# Patient Record
Sex: FEMALE | Race: WHITE | Employment: FULL TIME | URBAN - METROPOLITAN AREA
[De-identification: names, ages, dates, MRNs, and addresses within clinical notes are randomized per-mention and may not be internally consistent; named-entity substitution may affect disease eponyms.]

---

## 2021-02-22 LAB — PAP SMEAR, EXTERNAL: NEGATIVE

## 2022-09-01 ENCOUNTER — ROUTINE PRENATAL (OUTPATIENT)
Dept: OBGYN CLINIC | Age: 32
End: 2022-09-01
Payer: COMMERCIAL

## 2022-09-01 VITALS
SYSTOLIC BLOOD PRESSURE: 122 MMHG | DIASTOLIC BLOOD PRESSURE: 60 MMHG | WEIGHT: 140 LBS | BODY MASS INDEX: 23.9 KG/M2 | HEIGHT: 64 IN

## 2022-09-01 DIAGNOSIS — O36.80X0 ENCOUNTER TO DETERMINE FETAL VIABILITY OF PREGNANCY, SINGLE OR UNSPECIFIED FETUS: ICD-10-CM

## 2022-09-01 DIAGNOSIS — Z34.81 MULTIGRAVIDA IN FIRST TRIMESTER: ICD-10-CM

## 2022-09-01 DIAGNOSIS — Z34.81 MULTIGRAVIDA IN FIRST TRIMESTER: Primary | ICD-10-CM

## 2022-09-01 LAB
ABO + RH BLD: NORMAL
BASOPHILS # BLD: 0 K/UL (ref 0–0.2)
BASOPHILS NFR BLD: 0 % (ref 0–2)
BLOOD GROUP ANTIBODIES SERPL: NORMAL
DIFFERENTIAL METHOD BLD: NORMAL
EOSINOPHIL # BLD: 0.2 K/UL (ref 0–0.8)
EOSINOPHIL NFR BLD: 2 % (ref 0.5–7.8)
ERYTHROCYTE [DISTWIDTH] IN BLOOD BY AUTOMATED COUNT: 12.6 % (ref 11.9–14.6)
HBV SURFACE AG SER QL: NONREACTIVE
HCT VFR BLD AUTO: 38.8 % (ref 35.8–46.3)
HCV AB SER QL: NONREACTIVE
HGB BLD-MCNC: 12.6 G/DL (ref 11.7–15.4)
HIV 1+2 AB+HIV1 P24 AG SERPL QL IA: NONREACTIVE
HIV 1/2 RESULT COMMENT: NORMAL
IMM GRANULOCYTES # BLD AUTO: 0 K/UL (ref 0–0.5)
IMM GRANULOCYTES NFR BLD AUTO: 0 % (ref 0–5)
LYMPHOCYTES # BLD: 1.7 K/UL (ref 0.5–4.6)
LYMPHOCYTES NFR BLD: 17 % (ref 13–44)
MCH RBC QN AUTO: 29.6 PG (ref 26.1–32.9)
MCHC RBC AUTO-ENTMCNC: 32.5 G/DL (ref 31.4–35)
MCV RBC AUTO: 91.3 FL (ref 79.6–97.8)
MONOCYTES # BLD: 0.5 K/UL (ref 0.1–1.3)
MONOCYTES NFR BLD: 5 % (ref 4–12)
NEUTS SEG # BLD: 7.6 K/UL (ref 1.7–8.2)
NEUTS SEG NFR BLD: 76 % (ref 43–78)
NRBC # BLD: 0 K/UL (ref 0–0.2)
PLATELET # BLD AUTO: 266 K/UL (ref 150–450)
PMV BLD AUTO: 12 FL (ref 9.4–12.3)
RBC # BLD AUTO: 4.25 M/UL (ref 4.05–5.2)
RUBV IGG SERPL IA-ACNC: 356.3 IU/ML (ref 0–50)
WBC # BLD AUTO: 10 K/UL (ref 4.3–11.1)

## 2022-09-01 PROCEDURE — 76801 OB US < 14 WKS SINGLE FETUS: CPT | Performed by: OBSTETRICS & GYNECOLOGY

## 2022-09-01 PROCEDURE — 99902 PR PRENATAL VISIT: CPT | Performed by: OBSTETRICS & GYNECOLOGY

## 2022-09-01 ASSESSMENT — PATIENT HEALTH QUESTIONNAIRE - PHQ9
SUM OF ALL RESPONSES TO PHQ QUESTIONS 1-9: 0
2. FEELING DOWN, DEPRESSED OR HOPELESS: 0
SUM OF ALL RESPONSES TO PHQ9 QUESTIONS 1 & 2: 0
SUM OF ALL RESPONSES TO PHQ QUESTIONS 1-9: 0
1. LITTLE INTEREST OR PLEASURE IN DOING THINGS: 0

## 2022-09-01 NOTE — PATIENT INSTRUCTIONS
We will call you if anything is abnormal from your testing today. Please contact the office or seek immediate care if you develop fever > 101.0, severe lower abdominal pain or heavy vaginal bleeding (soaking 2 or more pads per hour). Thanks for coming to see us today and letting us take care of you!

## 2022-09-01 NOTE — ASSESSMENT & PLAN NOTE
Instructed pt to contact the office or seek immediate care if develops fever > 101.0, severe lower abdominal pain or heavy vaginal bleeding (soaking 2 or more pads per hour).      PNLs, pap, new OB packet today

## 2022-09-01 NOTE — PROGRESS NOTES
Patient comes in today for initial prenatal visit. No complaints/concerns today. Fetal Movements:  No  Contractions:  No  Vaginal Bleeding:  No  Leaking Fluid:  No  GI/ issues:  No    Drug/Alcohol 4P's Plus Screening    1. Have either of your parents ever had a problem with drugs/alcohol/prescription drugs? No  2. Does your partner have a problem with drugs/alcohol/prescription drugs? No  3. In the past, have you ever had a problem with drugs/alcohol/prescription drugs? No  4. Before you were pregnant, in the past month, have you done any drugs, drank any alcohol or abused any prescription drugs? No  If \"YES\" to any of the above, please give further details:  N/A    LAST PAP:  2+ years ago    LAST MAMMO:  Never    LMP:  Patient's last menstrual period was 07/05/2022.     FAMILY HISTORY OF:   Breast Cancer:  No   Ovarian Cancer:  No   Uterine Cancer:  No   Colon Cancer:  No    Vitals:    09/01/22 1027   BP: 122/60   Site: Left Upper Arm   Position: Sitting   Weight: 140 lb (63.5 kg)   Height: 5' 4\" (1.626 m)        Liana Talavera MA  09/01/22  10:31 AM

## 2022-09-02 LAB
EST. AVERAGE GLUCOSE BLD GHB EST-MCNC: 111 MG/DL
HBA1C MFR BLD: 5.5 % (ref 4.8–5.6)
RPR SER QL: NONREACTIVE

## 2022-09-05 LAB
HGB A MFR BLD: 97.7 % (ref 96.4–98.8)
HGB A2 MFR BLD COLUMN CHROM: 2.3 % (ref 1.8–3.2)
HGB F MFR BLD: 0 % (ref 0–2)
HGB FRACT BLD-IMP: NORMAL
HGB S MFR BLD: 0 %

## 2022-09-07 LAB
C TRACH RRNA CVX QL NAA+PROBE: NEGATIVE
CYTOLOGIST CVX/VAG CYTO: NORMAL
CYTOLOGY CVX/VAG DOC THIN PREP: NORMAL
HPV APTIMA: NEGATIVE
Lab: NORMAL
N GONORRHOEA RRNA CVX QL NAA+PROBE: NEGATIVE
PATH REPORT.FINAL DX SPEC: NORMAL
STAT OF ADQ CVX/VAG CYTO-IMP: NORMAL
T VAGINALIS RRNA SPEC QL NAA+PROBE: NEGATIVE

## 2022-09-13 DIAGNOSIS — Z87.42 HISTORY OF ABNORMAL CERVICAL PAP SMEAR: ICD-10-CM

## 2022-10-03 ENCOUNTER — ROUTINE PRENATAL (OUTPATIENT)
Dept: OBGYN CLINIC | Age: 32
End: 2022-10-03

## 2022-10-03 VITALS
DIASTOLIC BLOOD PRESSURE: 60 MMHG | SYSTOLIC BLOOD PRESSURE: 122 MMHG | BODY MASS INDEX: 24.24 KG/M2 | HEIGHT: 64 IN | WEIGHT: 142 LBS

## 2022-10-03 DIAGNOSIS — Z34.81 MULTIGRAVIDA IN FIRST TRIMESTER: Primary | ICD-10-CM

## 2022-10-03 DIAGNOSIS — N39.3 STRESS INCONTINENCE OF URINE: ICD-10-CM

## 2022-10-03 PROCEDURE — 99902 PR PRENATAL VISIT: CPT | Performed by: NURSE PRACTITIONER

## 2022-10-03 ASSESSMENT — PATIENT HEALTH QUESTIONNAIRE - PHQ9
2. FEELING DOWN, DEPRESSED OR HOPELESS: 0
SUM OF ALL RESPONSES TO PHQ QUESTIONS 1-9: 0
SUM OF ALL RESPONSES TO PHQ QUESTIONS 1-9: 0
SUM OF ALL RESPONSES TO PHQ9 QUESTIONS 1 & 2: 0
SUM OF ALL RESPONSES TO PHQ QUESTIONS 1-9: 0
1. LITTLE INTEREST OR PLEASURE IN DOING THINGS: 0
SUM OF ALL RESPONSES TO PHQ QUESTIONS 1-9: 0

## 2022-10-03 NOTE — PROGRESS NOTES
Patient comes in today for routine prenatal visit. No complaints/concerns today.      Fetal Movement: No  Contractions: No  Vaginal Bleeding: No  Leaking Fluid: No  GI/: No    Vitals:    10/03/22 0837   BP: 122/60   Site: Left Upper Arm   Position: Sitting   Weight: 142 lb (64.4 kg)   Height: 5' 4\" (1.626 m)

## 2022-10-03 NOTE — PROGRESS NOTES
This is a 29 y.o.   at [address] for routine OB visit. Her Estimated Due Date is 2023, by Last Menstrual Period    Denies leaking of fluid, vaginal bleeding, or regular contractions. Current Outpatient Medications on File Prior to Visit   Medication Sig Dispense Refill    Prenatal Vit-Fe Fumarate-FA (PRENATAL VITAMIN PO) Take by mouth       No current facility-administered medications on file prior to visit.        Allergies   Allergen Reactions    Penicillins Rash       OB History    Para Term  AB Living   3 1 1 0 1 1   SAB IAB Ectopic Molar Multiple Live Births   1 0 0 0 0 1       # 1 - Date: , Sex: None, Weight: None, GA: None, Delivery: None, Apgar1: None, Apgar5: None, Living: None, Birth Comments: None    # 2 - Date: 01/10/21, Sex: Male, Weight: 7 lb 4.9 oz (3.313 kg), GA: 38w5d, Delivery: Vaginal, Spontaneous, Apgar1: 9, Apgar5: 9, Living: Living, Birth Comments: None    # 3 - Date: None, Sex: None, Weight: None, GA: None, Delivery: None, Apgar1: None, Apgar5: None, Living: None, Birth Comments: None        Past Medical History:   Diagnosis Date    Abnormal Pap smear of cervix     Anemia 2009    Stress incontinence     With sneezing       Past Surgical History:   Procedure Laterality Date    COLPOSCOPY      Have had 3 or so since the first       Family History   Problem Relation Age of Onset    Heart Surgery Paternal Grandfather     Stroke Maternal Grandmother     Breast Cancer Neg Hx     Colon Cancer Neg Hx     Ovarian Cancer Neg Hx     Uterine Cancer Neg Hx        Social History     Socioeconomic History    Marital status:      Spouse name: Not on file    Number of children: Not on file    Years of education: Not on file    Highest education level: Not on file   Occupational History    Not on file   Tobacco Use    Smoking status: Never    Smokeless tobacco: Never   Substance and Sexual Activity    Alcohol use: Not Currently Alcohol/week: 7.0 standard drinks     Types: 2 Glasses of wine, 5 Cans of beer per week    Drug use: Never    Sexual activity: Yes     Partners: Male   Other Topics Concern    Not on file   Social History Narrative    Abuse: Feels safe at home, no history of physical abuse, no history of sexual abuse      Social Determinants of Health     Financial Resource Strain: Not on file   Food Insecurity: Not on file   Transportation Needs: Not on file   Physical Activity: Not on file   Stress: Not on file   Social Connections: Not on file   Intimate Partner Violence: Not on file   Housing Stability: Not on file           Objective    Vitals:    10/03/22 0837   BP: 122/60   Site: Left Upper Arm   Position: Sitting   Weight: 142 lb (64.4 kg)   Height: 5' 4\" (1.626 m)       General: well developed, well nourished, in no acute distress    Head: normocephalic and atraumatic    Resp: even and unlabored    Psych: Normal mood and affect        Assessment and Plan      Patient Active Problem List    Diagnosis Date Noted    History of abnormal cervical Pap smear 09/13/2022     Priority: Medium     Overview Note:     2015 LGSIL, Colpo performed with no biopsies  2017 ASCUS HPV +, Colpo CIN1  2018 Pap negative  02/22/2021 Pap negative      Multigravida in first trimester 09/01/2022     Overview Note:     EDC by LMP confirmed by 8 4/7 week US       Assessment & Plan Note:     PTL/labor precautions, [address], and pregnancy warning signs reviewed. Pt advised to call the office at [phone] or go straight to [facility] Delivery [facility] with any of the following concerns vaginal bleeding, leaking of fluid, dusty regularly Q 5-7 minutes for over an hour or not feeling the baby move. RTO 4 weeks OBV with AFP         Problem List Items Addressed This Visit        Other    Multigravida in first trimester - Primary     PTL/labor precautions, [address] [place], and pregnancy warning signs reviewed.  Pt advised to call the office at [phone] or go straight to [facility] and Delivery at [facility] with any of the following concerns vaginal bleeding, leaking of fluid, dusty regularly Q 5-7 minutes for over an hour or not feeling the baby move. RTO 4 weeks OBV with AFP            No orders of the defined types were placed in this encounter. Outpatient Encounter Medications as of 10/3/2022   Medication Sig Dispense Refill    Prenatal Vit-Fe Fumarate-FA (PRENATAL VITAMIN PO) Take by mouth       No facility-administered encounter medications on file as of 10/3/2022.                Labor signs, pregnancy warning signs, and fetal movement counting reviewed (if applicable)        Clint Doherty NP, APRN - CNP 10/03/22 8:55 AM

## 2022-10-03 NOTE — ASSESSMENT & PLAN NOTE
PTL/labor precautions, [address], and pregnancy warning signs reviewed. Pt advised to call the office at [phone] or go straight to Labor and Delivery at [facility] with any of the following concerns vaginal bleeding, leaking of fluid, dusty regularly Q 5-7 minutes for over an hour or not feeling the baby move.    RTO 4 weeks OBV with AFP    Pt requests referral to pelvic floor PT for Stress Urin Incontinence

## 2022-10-03 NOTE — PROGRESS NOTES
I have reviewed the patient's visit today including history, exam and assessment by SABI Pradhan. I agree with treatment/plan as above.     Sukhdev Lemus MD  9:11 AM  10/03/22

## 2022-10-13 ENCOUNTER — TELEPHONE (OUTPATIENT)
Dept: OBGYN CLINIC | Age: 32
End: 2022-10-13

## 2022-10-13 DIAGNOSIS — Z34.81 MULTIGRAVIDA IN FIRST TRIMESTER: ICD-10-CM

## 2022-10-13 LAB — NIPT, EXTERNAL: NORMAL

## 2022-10-13 NOTE — TELEPHONE ENCOUNTER
Please let pt know her genetics screening tests were all normal    NIPT neg x3, CF/SMA/DMD/Fragile X  negative    If she wants to know the sex of the baby it is a baby boy

## 2022-10-26 ENCOUNTER — HOSPITAL ENCOUNTER (OUTPATIENT)
Dept: PHYSICAL THERAPY | Age: 32
Setting detail: RECURRING SERIES
Discharge: HOME | End: 2022-10-29
Payer: COMMERCIAL

## 2022-10-26 PROCEDURE — 97110 THERAPEUTIC EXERCISES: CPT

## 2022-10-26 PROCEDURE — 97161 PT EVAL LOW COMPLEX 20 MIN: CPT

## 2022-10-26 NOTE — PROGRESS NOTES
R[place]le[place] Angela  : 1990  Primary: [employer]  Secondary:  [address] [address]  Phone: [phone]  Fax: [phone] Plan Frequency: 1x/week  No data recorded    PT Visit Info:  No data recorded   Visit Count:  1   OUTPATIENT PHYSICAL THERAPY:OP NOTE TYPE: Treatment Note 10/26/2022       Episode  }Appt Desk               Treatment Diagnosis:  Stress incontinence (female) (male) (N39.3)  Dyspareunia (N94.1)  Myalgia (M79.1)  Medical/Referring Diagnosis:  Multigravida in first trimester [Z34.81]  Stress incontinence of urine [N39.3]  Referring Physician:  MAYNOR Padilla CNP, MD Orders:  PT Eval and Treat   Date of Onset:  No data recorded   Allergies:   Penicillins  Restrictions/Precautions:  No data recorded  No data recorded   Interventions Planned (Treatment may consist of any combination of the following):    Current Treatment Recommendations: Strengthening; ROM; Functional mobility training; Neuromuscular re-education; Manual Therapy - Soft Tissue Mobilization; Home exercise program     Subjective Comments: 16 weeks pregnant, SURESH, dyspareunia     Initial:}    0 /10Post Session:       0 /10  Medications Last Reviewed:  10/26/2022  Updated Objective Findings:  See evaluation note from today  Treatment   THERAPEUTIC EXERCISE: (25 minutes):    Exercises per grid below to improve mobility, strength, and coordination. Required minimal visual and verbal cues to promote proper body mechanics and promote proper body breathing techniques. Progressed range and repetitions as indicated.    Date:  10/26/22 Date:   Date:     Activity/Exercise Parameters Parameters Parameters   Diaphragmatic breathing/PF drops  3-5 min at home with LE elevated  Coordinated PFM contractions with breathing 2 x 10, daily  Knack, check-ins  Clams - side-lying x 30 daily Prescribed, explained rationale, POC     sEMG biofeedback 5 min - PFM coordination of contractions                Treatment/Session Summary:    Treatment Assessment: Pt verbalized understanding of POC. All questions answered at this time. Communication/Consultation:  None today  Equipment provided today:  None  Recommendations/Intent for next treatment session: Next visit will focus on MT to PFM, review coordination of PFM contractions with digital cues, hip strengthening.     Total Treatment Billable Duration:  25 minutes treatment, 30 minutes evaluation  Time In: 0800  Time Out: 0900    [facility], PT       Charge Capture  }Post Session Pain  PT Visit Info  [employer] Portal  MD Guidelines  Scanned Media  Benefits  MyChart    Future Appointments   Date Time Provider Radha Garza   10/31/2022  8:45 AM [facility] AM[facility]   11/11/2022  8:00 AM [facility], PT [facility]   11/18/2022  8:00 AM [facility], PT [facility]   11/21/2022  8:00 AM [facility], PT [facility]   12/2/2022  8:00 AM [facility], PT [facility]

## 2022-10-26 NOTE — THERAPY EVALUATION
Angel Woods  : 1990  Primary: [employer]  Secondary:  [facility] [address] [address]  Phone: [phone]  Fax: [phone] Plan Frequency: 1x/week       PT Visit Info:         Visit Count:  1                OUTPATIENT PHYSICAL THERAPY:             OP NOTE TYPE: Initial Assessment 10/26/2022               Episode  Appt Desk         Treatment Diagnosis:  Stress incontinence (female) (male) (N39.3)  Dyspareunia (N94.1)  Myalgia (M79.1)  Medical/Referring Diagnosis:  Multigravida in first trimester [Z34.81]  Stress incontinence of urine [N39.3]  Referring Physician:  MAYNOR Dewitt CNP, MD Orders:  PT Eval and Treat   Return MD Appt:    Date of Onset:      Allergies:  Penicillins  Restrictions/Precautions:  none currently         Medications Last Reviewed:  10/26/2022     SUBJECTIVE   History of Injury/Illness (Reason for Referral):  Pt is currently 16 weeks  with symptoms of SURESH with sneezing which she feels has worsened recently. This began about 2.5-3 years ago. Denies  UUI. Pt is able to complete knack to reduce UI. Pt reports discomfort present during sexual intercourse at site of grade 2 tear. States she had granulation tissue present. With prolonged standing and on her menses she also experienced generalized pelvic discomfort, described as dull aching. She denies using a lubricant during sexual intercourse.     0 PPD. Pt reports first pregnancy she did not have any difficulty during. Her first baby was 7lbs, 7oz and she pushed for 1 hour. Left sided LBP. This occurs with sleeping in side-lying. She switches positions and this tends to go away. Denies pelvic surgeries. Bowel: Movements occur daily. She rarely needs to push/ or strain. Pt works as a PTA. She has taken a level 1 H&[employer] course. Exercise: She is not currently exercising.  States she used to run and would like to return to this following her pregnancy. She is not completing any strength training at this time. Patient Stated Goal(s): \"To stop urinary leakage with sneezing and prep for birth\"  Initial:   0   /10 Post Session:   0   /10  Past Medical History/Comorbidities:   Ms. Sunita Tamez  has a past medical history of Abnormal Pap smear of cervix, Anemia, and Stress incontinence. Ms. Sunita Tamez  has a past surgical history that includes Colposcopy (2014). Social History/Living Environment:   Lives With: Spouse; Son     Prior Level of Function/Work/Activity:   Prior level of function: Independent  Occupation: Full time employment  Type of Occupation: PTA           Learning:   Does the patient/guardian have any barriers to learning?: No barriers     Fall Risk Scale:    Burroughs Total Score: 0  Burroughs Fall Risk: Low (0-24)         OBJECTIVE   External Observations:  Voluntary contraction: [] absent     [x] present  Voluntary relaxation: [] absent     [x] present  Involuntary contraction: [] absent     [x] present  Involuntary relaxation: [] absent     [x] present  Perineal descent at rest: [x] absent     [] present  Perineal descent with bearing: [] absent     [x] present  Skin integrity: no concerns  Postural assessment: WNL  Gait: WNL    Pelvic Floor Muscle (PFM) Assessment:  Vaginal vault size: [] decreased     [] increased     [x] WNL  Muscle volume: [] decreased     [] WNL     [x] Defect  PFM tension: [] decreased     [x] increased     [] WNL    Contraction ability:  Voluntary contraction: [] absent     [] weak     [x] moderate      [] strong  Voluntary relaxation: [] absent     [] partial   [] complete   [x] delayed    [x] incomplete    MMT: 4/5   PFM endurance: 10 seconds   Repetitions of endurance contractions: 4  Number of quick contractions in 10 seconds: 7  Quality of contractions: Good  Overflow: [] absent     [x] min     [] mod     [] severe / Compensatory mm groups include adductors    Palpation: via vaginal canal   Superficial Pelvic Floor Musculature (PFM): Tender? Intermediate PFM Tender? Deep PFM Tender? Superficial Transverse Perineal [] Right  [] Left  []DNT Deep Transverse Perineal [x] Right  [x] Left  []DNT Puborectalis [x] Right  [x] Left  []DNT   Ischiocavernosus [] Right  [] Left  []DNT Compressor Urethra [] Right  [] Left  []DNT Pubococcygeus [] Right  [] Left  []DNT   Bulbocavernosus [x] Right  [x] Left  []DNT   Iliococcygeus [x] Right  [x] Left  []DNT       Obturator Internus [x] Right  [x] Left  []DNT       Coccygeus [] Right  [] Left  []DNT     Strength:   Left Right   Hip flexion (L2/3) /5 /5   Hip extension (L4-S1) /5 /5   Knee flexion (L4-S2) /5 /5   Knee extension (L3/4) /5 /5   Hip internal rotation (L4-S1) 5/5 5/5   Hip external rotation (L4-S1) 4/5 4/5   Hip abduction (L4-S1) 4/5 4/5   Hip adduction (L4-S1) /5 /5   Dorsiflexion (L4/5) /5 /5   Plantarflexion (S1/S2) /5 /5     Range of motion:  LE ROM Left Right   Hip flexion (100-120)     Hip extension (15)     Knee flexion (>130)     Knee extension (0)     Hip internal rotation (30-40) WNL WNL   Hip external rotation (40-50) WNL WNL   Hip abduction (40-45)     Hip adduction         Breath assessment:  Observation: chest breathing dominant and A/P chest expansion  Coordination of pelvic floor muscles with breath: minimal pelvic floor muscle excursion  Co-contraction of PFM with transversus abdominis: present PF > TrA    Special tests/Movement screens:   Single leg stance:  mild [place] present  Deep squat: asymmetrical weightbearing, towards LLE. Sacroiliac Dysfunction Positive/Negative Sx onset in seconds   Posterior Tight Thrust/P4     Menells' Test     Daren's/BOBBY     Active Straight Leg Raise (ASLR) Completed. Mild pelvic rotation. No pain present.        Pubic Symphysis Dysfunction Positive/Negative Sx onset in seconds   Single leg stance/Trendelenburg (+)    Palpation of pubic symphysis (+ if >5 seconds) Not completed      Diastasis Recti    At umbilicus (-)   1 inch above umbilicus (-)   1 inch below umbilicus (-)   TA contraction (-)         SEMG evaluation: Date 10/26/22  Position: Side-lying, Electrode type: sEMG  Resting tone: 1. 2uV  Quality of rest: [] irregular     [] elevated     [x] WNL    Quality of:    Recruitment: [] slow     [] fair     [x] good   Derecruitment: [] slow     [x] fair     [] good   Holding: [] poor     [] fair     [x] good   Stability of hold: [] poor     [] fair     [x] good   Stability of rest: [] poor     [] fair     [x] good   Baseline b/t contract: [] poor     [] fair     [x] good   Overflow: [] absent     [x] min     [] mod     [] severe - adductors    ASSESSMENT   Initial Assessment: Angel Woods presents with musculoskeletal limitations including pelvic floor muscle (PFM) tension, reduced PFM Range of Motion (ROM), increased tenderness to palpation of the PFM, reduced coordination and awareness of PFM, reduced hip strength, poor abdominal strength, and decreased pelvic floor muscle (PFM) strength. These limitations are impairing the patient's ability to properly coordinate perineal elevation and descent for normalized PFM function, contributing to urinary dysfunction and sexual dysfunction. As a result, she is limited in her/his ability to sneeze without UI and participate in sexual intercourse without pelvic pain/discomfort. Problem List: (Impacting functional limitations): Body Structures, Functions, Activity Limitations Requiring Skilled Therapeutic Intervention: Decreased ROM; Decreased strength; Decreased coordination;  Increased pain     Therapy Prognosis:   Therapy Prognosis: Excellent     Assessment Complexity:   Low Complexity  PLAN   Effective Dates: 10/26/22 TO   01/24/23   Frequency/Duration: Plan Frequency: 1x/week   Interventions Planned (Treatment may consist of any combination of the following):    Current Treatment Recommendations: Strengthening; ROM; Functional mobility training; Neuromuscular re-education; Manual Therapy - Soft Tissue Mobilization; Home exercise program     Goals: (Goals have been discussed and agreed upon with patient.)  Short-Term Functional Goals: Time Frame: 6 weeks  Pt will demonstrate I with basic PFM HEP to improve awareness, coordination, and timing of PFM. Patient will demonstrate appropriate co-contraction of the transversus abdominis and pelvic floor muscle group during exhalation in order to reduce IAP and improve functional task performance including lifting, bending, pushing. Patient will demonstrate ability to perform diaphragmatic breathing in multiple positions to assist in pelvic floor tension reduction. Patient will verbalize understanding and demonstrate postural adjustments which facilitate lengthening and reduce overall pelvic floor muscle activity. Patient will be independent in a home hip strengthening and stability program to be completed daily to assist in PFM support  Discharge Goals: Time Frame: 12 weeks  Patient will demonstrate ability to voluntarily contract the pelvic floor muscles prior to a cough or sneeze for decreased leakage during increases in intra-abdominal pressure. Patient will demonstrate independence with an advanced HEP for general conditioning, core stabilization, and mobility to facilitate carry over and independent management of symptoms. Patient will demonstrate normal voluntary relaxation of the pelvic floor muscle group to improve pelvic floor ROM and tolerate pain free vaginal penetration. Patient will demonstrate ability to eccentrically lengthen her PFM for preparation for vaginal delivery. Patient will be independent in performing self perineal massage to improve connective tissue mobility prior to vaginal delivery. Medical Necessity:   > Skilled intervention continues to be required due to the above mentioned deficits.   Reason For Services/Other Comments:  > Patient continues to require skilled intervention due to the above mentioned deficits. Total Duration:  Time In: 0800  Time Out: 0900    Regarding Lemmie Lanes Carroll's therapy, I certify that the treatment plan above will be carried out by a therapist or under their direction.   Thank you for this referral,  Main Pedro PT     Referring Physician Signature: MAYNOR Díaz - * _______________________________ Date _____________        Post Session Pain  Charge Capture  PT Visit Info MD Guidelines  MyChart

## 2022-10-31 ENCOUNTER — ROUTINE PRENATAL (OUTPATIENT)
Dept: OBGYN CLINIC | Age: 32
End: 2022-10-31

## 2022-10-31 VITALS
WEIGHT: 144 LBS | HEIGHT: 64 IN | SYSTOLIC BLOOD PRESSURE: 104 MMHG | BODY MASS INDEX: 24.59 KG/M2 | DIASTOLIC BLOOD PRESSURE: 58 MMHG

## 2022-10-31 DIAGNOSIS — Z34.82 MULTIGRAVIDA IN SECOND TRIMESTER: ICD-10-CM

## 2022-10-31 DIAGNOSIS — Z34.82 MULTIGRAVIDA IN SECOND TRIMESTER: Primary | ICD-10-CM

## 2022-10-31 PROCEDURE — 99902 PR PRENATAL VISIT: CPT | Performed by: NURSE PRACTITIONER

## 2022-10-31 NOTE — ASSESSMENT & PLAN NOTE
PTL/labor precautions, [address], and pregnancy warning signs reviewed. Pt advised to call the office at [phone] or go straight to Labor and Delivery at [address] with any of the following concerns vaginal bleeding, leaking of fluid, dusty regularly Q 5-7 minutes for over an hour or not feeling the baby move.    RTO 4 weeks OBV, anatomy US

## 2022-10-31 NOTE — PROGRESS NOTES
This is a 29 y.o.   at [place] [address] for routine OB visit. Her Estimated Due Date is 2023, by Last Menstrual Period    Denies leaking of fluid, vaginal bleeding, or regular contractions. Current Outpatient Medications on File Prior to Visit   Medication Sig Dispense Refill    Prenatal Vit-Fe Fumarate-FA (PRENATAL VITAMIN PO) Take by mouth       No current facility-administered medications on file prior to visit.        Allergies   Allergen Reactions    Penicillins Rash       OB History    Para Term  AB Living   3 1 1 0 1 1   SAB IAB Ectopic Molar Multiple Live Births   1 0 0 0 0 1       # 1 - Date: , Sex: None, Weight: None, GA: None, Delivery: None, Apgar1: None, Apgar5: None, Living: None, Birth Comments: None    # 2 - Date: 01/10/21, Sex: Male, Weight: 7 lb 4.9 oz (3.313 kg), GA: 38w5d, Delivery: Vaginal, Spontaneous, Apgar1: 9, Apgar5: 9, Living: Living, Birth Comments: None    # 3 - Date: None, Sex: None, Weight: None, GA: None, Delivery: None, Apgar1: None, Apgar5: None, Living: None, Birth Comments: None        Past Medical History:   Diagnosis Date    Abnormal Pap smear of cervix     Anemia     Stress incontinence     With sneezing       Past Surgical History:   Procedure Laterality Date    COLPOSCOPY      Have had 3 or so since the first       Family History   Problem Relation Age of Onset    Heart Surgery Paternal Grandfather     Stroke Maternal Grandmother     Breast Cancer Neg Hx     Colon Cancer Neg Hx     Ovarian Cancer Neg Hx     Uterine Cancer Neg Hx        Social History     Socioeconomic History    Marital status:      Spouse name: Not on file    Number of children: Not on file    Years of education: Not on file    Highest education level: Not on file   Occupational History    Not on file   Tobacco Use    Smoking status: Never    Smokeless tobacco: Never   Substance and Sexual Activity    Alcohol use: Not Currently Alcohol/week: 7.0 standard drinks     Types: 2 Glasses of wine, 5 Cans of beer per week    Drug use: Never    Sexual activity: Yes     Partners: Male   Other Topics Concern    Not on file   Social History Narrative    Abuse: Feels safe at home, no history of physical abuse, no history of sexual abuse      Social Determinants of Health     Financial Resource Strain: Not on file   Food Insecurity: Not on file   Transportation Needs: Not on file   Physical Activity: Not on file   Stress: Not on file   Social Connections: Not on file   Intimate Partner Violence: Not on file   Housing Stability: Not on file           Objective    Vitals:    10/31/22 0839   BP: (!) 104/58   Site: Left Upper Arm   Position: Sitting   Weight: 144 lb (65.3 kg)   Height: 5' 4\" (1.626 m)       General: well developed, well nourished, in no acute distress    Head: normocephalic and atraumatic    Resp: even and unlabored    Psych: Normal mood and affect        Assessment and Plan      Patient Active Problem List    Diagnosis Date Noted    History of abnormal cervical Pap smear 09/13/2022     Priority: Medium     Overview Note:     2015 LGSIL, Colpo performed with no biopsies  2017 ASCUS HPV +, Colpo CIN1  2018 Pap negative  02/22/2021 Pap negative      Multigravida in second trimester 09/01/2022     Overview Note:     EDC by LMP confirmed by 8 4/7 week US    10/03/2022: NIPT neg x3, male, CF/SMA/DMD/Fragile X  negative       Assessment & Plan Note:     PTL/labor precautions, [facility], and pregnancy warning signs reviewed. Pt advised to call the office at [phone] or go straight to Labor and Delivery at [facility] with any of the following concerns vaginal bleeding, leaking of fluid, dusty regularly Q 5-7 minutes for over an hour or not feeling the baby move.    RTO 4 weeks OBV, anatomy US         Problem List Items Addressed This Visit        Other    Multigravida in second trimester - Primary     PTL/labor precautions, [facility], and pregnancy warning signs reviewed. Pt advised to call the office at [phone] or go straight to Labor and Delivery at [address] with any of the following concerns vaginal bleeding, leaking of fluid, dusty regularly Q 5-7 minutes for over an hour or not feeling the baby move. RTO 4 weeks OBV, anatomy US         Relevant Orders    Alpha Fetoprotein, Maternal       Orders Placed This Encounter   Procedures    Alpha Fetoprotein, Maternal       Outpatient Encounter Medications as of 10/31/2022   Medication Sig Dispense Refill    Prenatal Vit-Fe Fumarate-FA (PRENATAL VITAMIN PO) Take by mouth       No facility-administered encounter medications on file as of 10/31/2022.                Labor signs, pregnancy warning signs, and fetal movement counting reviewed (if applicable)        Yessenia Castillo NP, APRN - CNP 10/31/22 8:51 AM

## 2022-10-31 NOTE — PROGRESS NOTES
Patient comes in today for routine prenatal visit. No complaints/concerns today.      Fetal Movement: No  Contractions: No  Vaginal Bleeding: No  Leaking Fluid: No  GI/: No    Vitals:    10/31/22 0839   BP: (!) 104/58   Site: Left Upper Arm   Position: Sitting   Weight: 144 lb (65.3 kg)   Height: 5' 4\" (1.626 m)

## 2022-10-31 NOTE — PROGRESS NOTES
I have reviewed the patient's visit today including history, exam and assessment by SABI Estrada. I agree with treatment/plan as above.     Betsy Quiroz MD  9:09 AM  10/31/22

## 2022-11-03 LAB
AFP INTERP SERPL-IMP: NORMAL
AFP MOM SERPL: 1.3
AFP SERPL-MCNC: 52.8 NG/ML
AGE AT DELIVERY: 30.9 YR
COMMENT: NORMAL
GA METHOD: NORMAL
GA: 16.9 WEEKS
IDDM PATIENT QL: NO
Lab: NORMAL
MAT SCN FOR FETAL ABNORMALITIES SERPL: NORMAL
MULTIPLE PREGNANCY: NO
NEURAL TUBE DEFECT RISK FETUS: 4803 %

## 2022-11-11 ENCOUNTER — HOSPITAL ENCOUNTER (OUTPATIENT)
Dept: PHYSICAL THERAPY | Age: 32
Setting detail: RECURRING SERIES
Discharge: HOME | End: 2022-11-14
Payer: COMMERCIAL

## 2022-11-11 PROCEDURE — 97110 THERAPEUTIC EXERCISES: CPT

## 2022-11-11 PROCEDURE — 97140 MANUAL THERAPY 1/> REGIONS: CPT

## 2022-11-11 NOTE — PROGRESS NOTES
Dior Ryan  : 1990  Primary: [employer]  Secondary:  [facility] [address] [address]  Phone: [phone]  Fax: [phone] Plan Frequency: 1x/week  No data recorded    PT Visit Info:  No data recorded   Visit Count:  2   OUTPATIENT PHYSICAL THERAPY:OP NOTE TYPE: Treatment Note 2022       Episode  }Appt Desk               Treatment Diagnosis:  Stress incontinence (female) (male) (N39.3)  Dyspareunia (N94.1)  Myalgia (M79.1)  Medical/Referring Diagnosis:  Multigravida in first trimester [Z34.81]  Stress incontinence of urine [N39.3]  Referring Physician:  MAYNOR Ramsay CNP, MD Orders:  PT Eval and Treat   Date of Onset:  No data recorded   Allergies:   Penicillins  Restrictions/Precautions:  No data recorded  No data recorded   Interventions Planned (Treatment may consist of any combination of the following):    Current Treatment Recommendations: Strengthening; ROM; Functional mobility training; Neuromuscular re-education; Manual Therapy - Soft Tissue Mobilization; Home exercise program     Subjective Comments: 16 weeks pregnant, SURESH, dyspareunia  Pt reports left sided LBP has improved. Sexual intercourse still painful. One episode of UI. States is she does the knack she can typically control her symptoms. Initial:}    0 /10Post Session:       0 /10  Medications Last Reviewed:  2022  Updated Objective Findings:   PFM Tenderness: tender to palpation at B STP, DTP, iliococcygeus, and OI. Moderate PFM tension present  PFM Contractility: 4+/5  Mild delay in PFM relaxation      Treatment   THERAPEUTIC EXERCISE: (30 minutes):    Exercises per grid below to improve mobility, strength, and coordination. Required minimal visual and verbal cues to promote proper body mechanics and promote proper body breathing techniques. Progressed range and repetitions as indicated.    Date:  10/26/22 Date:  11/11/22 Date:     Activity/Exercise Parameters Parameters Parameters   Diaphragmatic breathing/PF drops  3-5 min at home with LE elevated  Coordinated PFM contractions with breathing 2 x 10, daily  Knack, check-ins  Clams - side-lying x 30 daily Prescribed, explained rationale, POC Updated - progressed clams to quadruped, added hip and thoracic mobility/stability (see ex below)    sEMG biofeedback 5 min  - PFM coordination of contractions     Tall kneeling hip flexor stretch  3 x 30 sec    Hip mobility -   Supine B hip IR  Hold until later in pregnancy, continue to work on hip ER    Thoracic mobility -   Seated UE flexion  Thoracic rotation    X 6 x 10 sec with ex ball  X10 - assisted hip stability    Quadruped Hip ER  3 x 10    Bird dog  X 10 x 5 sec  - zach pose stretch x 1 min with PF drops    Coordinated PFM contractions  With digital cues during exhalation,   Quick flicks - sets of 3  knack      MANUAL THERAPY: (25 minutes): Soft tissue mobilization was utilized and necessary because of the patient's painful spasm and restricted motion of soft tissue. Date Type Location Comments   11/11/2022 Internal assessment/treatment Via vaginal canal SP and digital sweeping through superficial and deep PFM                                       (Used abbreviations: MET - muscle energy technique; SCS- Strain counter strain; CTM-Connective tissue mobilizations; CR- Contract/Relax; SP- Sustained pressure; PIT- Positional inhibition techniques; STM Soft -tissue mobilization; MM- Myofascial mobilization; TrP-Trigger point release; IASTM- Instrument assisted soft tissue mobilizations, TDN-Trigger point dry needling)    Pt gives verbal consent to internal vaginal assessment/treatment without chaperon present. Treatment/Session Summary:    Treatment Assessment: Pt is progressing well in physical therapy. She is showing improved PFM awareness and coordination of PFM decent/ascent.  She continues to present with PFM overactivity and has tenderness with palpation of her PFM thus will benefit from continued MT. Communication/Consultation:  None today  Equipment provided today:  None  Recommendations/Intent for next treatment session: Next visit will focus on MT to PFM, review coordination of PFM contractions with digital cues, hip strengthening.     Total Treatment Billable Duration:  Treatment minutes 55  Time In: 0805  Time Out: 0900    Lori Jernigan, PT       Charge Capture  }Post Session Pain  PT Visit Info  [facility]  MD Guidelines  Scanned Media  Benefits  MyChart    Future Appointments   Date Time Provider Radha Garza   11/18/2022  8:00 AM Lori Jernigan, PT [facility]   11/21/2022  8:00 AM Lori Jernigan PT [facility] SFE   11/28/2022  8:00 AM BSO US [facility] AMB   12/2/2022  8:00 AM Lori Jernigan, PT [facility]

## 2022-11-18 ENCOUNTER — HOSPITAL ENCOUNTER (OUTPATIENT)
Dept: PHYSICAL THERAPY | Age: 32
Setting detail: RECURRING SERIES
Discharge: HOME | End: 2022-11-21
Payer: COMMERCIAL

## 2022-11-18 PROCEDURE — 97110 THERAPEUTIC EXERCISES: CPT

## 2022-11-18 PROCEDURE — 97140 MANUAL THERAPY 1/> REGIONS: CPT

## 2022-11-18 NOTE — PROGRESS NOTES
Chloé Jackson  : 1990  Primary: [employer]  Secondary:  Fabiola Mclain [address] [address]  Phone: [phone]  Fax: [phone] Plan Frequency: 1x/week  No data recorded    PT Visit Info:  No data recorded   Visit Count:  3   OUTPATIENT PHYSICAL THERAPY:OP NOTE TYPE: Treatment Note 2022       Episode  }Appt Desk               Treatment Diagnosis:  Stress incontinence (female) (male) (N39.3)  Dyspareunia (N94.1)  Myalgia (M79.1)  Medical/Referring Diagnosis:  Multigravida in first trimester [Z34.81]  Stress incontinence of urine [N39.3]  Referring Physician:  MAYNOR Verde CNP, MD Orders:  PT Eval and Treat   Date of Onset:  No data recorded   Allergies:   Penicillins  Restrictions/Precautions:  No data recorded  No data recorded   Interventions Planned (Treatment may consist of any combination of the following):    Current Treatment Recommendations: Strengthening; ROM; Functional mobility training; Neuromuscular re-education; Manual Therapy - Soft Tissue Mobilization; Home exercise program     Subjective Comments: 16 weeks pregnant, SURESH, dyspareunia  Pt reports 2 episodes of SURESH in last week. Pt states she has been aware of holding PFM tension with daily activities. Pt denies feeling sore following internal treatment. Previous subjective:   Pt reports left sided LBP has improved. Sexual intercourse still painful. One episode of UI. States is she does the knack she can typically control her symptoms. Initial:}    0 /10Post Session:       0 /10  Medications Last Reviewed:  2022  Updated Objective Findings:   PFM Tenderness: tender to palpation at B STP, B DTP, iliococcygeus, and OI (R>L).   Moderate PFM tension present  PFM Contractility: 4+/5  Mild delay in PFM relaxation    Treatment   THERAPEUTIC EXERCISE: (20 minutes):    Exercises per grid below to improve mobility, strength, and coordination. Required minimal visual and verbal cues to promote proper body mechanics and promote proper body breathing techniques. Progressed range and repetitions as indicated. Date:  10/26/22 Date:  11/11/22 Date:  11//18/22   Activity/Exercise Parameters Parameters Parameters   Diaphragmatic breathing/PF drops  3-5 min at home with LE elevated  Coordinated PFM contractions with breathing 2 x 10, daily  Knack, check-ins  Clams - side-lying x 30 daily Prescribed, explained rationale, POC Updated - progressed clams to quadruped, added hip and thoracic mobility/stability (see ex below)    sEMG biofeedback 5 min  - PFM coordination of contractions     Tall kneeling hip flexor stretch  3 x 30 sec 3 x 30 sec   Hip mobility -   Supine B hip IR  Hold until later in pregnancy, continue to work on hip ER    Thoracic mobility -   Seated UE flexion  Thoracic rotation    X 6 x 10 sec with ex ball  X10 - assisted hip stability   Quadruped thoracic rotation x 10   Quadruped Hip ER  3 x 10    Bird dog  X 10 x 5 sec  - zach pose stretch x 1 min with PF drops    Coordinated PFM contractions  With digital cues during exhalation,   Quick flicks - sets of 3  knack With digital cues during exhalation,   Quick flicks - sets of 3  knack   Seated hip ER with plyoball walkout   6 x 10 sec each   Seated V - adductor, hamstring mobility   With plyoball walk x 10     MANUAL THERAPY: (40 minutes): Soft tissue mobilization was utilized and necessary because of the patient's painful spasm and restricted motion of soft tissue.    Date Type Location Comments   11/18/2022 Internal assessment/treatment Via vaginal canal SP and digital sweeping through superficial and deep PFM     CTM Bilateral adductors, gluteals to facilitate PFM relaxation/le                                 (Used abbreviations: MET - muscle energy technique; SCS- Strain counter strain; CTM-Connective tissue mobilizations; CR- Contract/Relax; SP- Sustained pressure; PIT- Positional inhibition techniques; STM Soft -tissue mobilization; MM- Myofascial mobilization; TrP-Trigger point release; IASTM- Instrument assisted soft tissue mobilizations, TDN-Trigger point dry needling)    Pt gives verbal consent to internal vaginal assessment/treatment without chaperon present. Treatment/Session Summary:    Treatment Assessment: Pt continues to present with PFM overactivity and has tenderness with palpation of her PFM thus will benefit from continued MT. Continues to have global tenderness throughout PFM. Improved relaxation following activation of her PFM.       Communication/Consultation:  None today  Equipment provided today:  None  Recommendations/Intent for next treatment session: Next visit will focus on MT to PFM, review coordination of PFM contractions with digital cues, hip strengthening, continue hip mobility    Total Treatment Billable Duration:  Treatment minutes 55  Time In: 0805  Time Out: [address] [facility], PT       Charge Capture  }Post Session Pain  PT Visit [address] [facility] Portal  MD [address]    Future Appointments   Date Time Provider Radha Garza   11/28/2022  8:00 AM BSO US [facility] AMB   12/2/2022  8:00 AM [facility], PT [facility] SFE   12/8/2022  8:00 AM [facility], PT [facility] SFE   12/15/2022  8:00 AM [facility], PT [facility] SFE   12/22/2022  8:00 AM [facility], PT [facility]FE

## 2022-11-21 ENCOUNTER — APPOINTMENT (OUTPATIENT)
Dept: PHYSICAL THERAPY | Age: 32
End: 2022-11-21
Payer: COMMERCIAL

## 2022-11-28 ENCOUNTER — ROUTINE PRENATAL (OUTPATIENT)
Dept: OBGYN CLINIC | Age: 32
End: 2022-11-28
Payer: COMMERCIAL

## 2022-11-28 VITALS
WEIGHT: 148 LBS | BODY MASS INDEX: 25.27 KG/M2 | HEIGHT: 64 IN | DIASTOLIC BLOOD PRESSURE: 60 MMHG | SYSTOLIC BLOOD PRESSURE: 102 MMHG

## 2022-11-28 DIAGNOSIS — Z34.82 MULTIGRAVIDA IN SECOND TRIMESTER: ICD-10-CM

## 2022-11-28 DIAGNOSIS — Z36.89 ENCOUNTER FOR FETAL ANATOMIC SURVEY: Primary | ICD-10-CM

## 2022-11-28 PROCEDURE — 76805 OB US >/= 14 WKS SNGL FETUS: CPT | Performed by: NURSE PRACTITIONER

## 2022-11-28 PROCEDURE — 99902 PR PRENATAL VISIT: CPT | Performed by: NURSE PRACTITIONER

## 2022-11-28 ASSESSMENT — PATIENT HEALTH QUESTIONNAIRE - PHQ9
2. FEELING DOWN, DEPRESSED OR HOPELESS: 0
SUM OF ALL RESPONSES TO PHQ QUESTIONS 1-9: 0
SUM OF ALL RESPONSES TO PHQ9 QUESTIONS 1 & 2: 0
1. LITTLE INTEREST OR PLEASURE IN DOING THINGS: 0

## 2022-11-28 NOTE — ASSESSMENT & PLAN NOTE
PTL/labor precautions, [address], and pregnancy warning signs reviewed. Pt advised to call the office at [phone] or go straight to Labor and Delivery at [facility] with any of the following concerns vaginal bleeding, leaking of fluid, dusty regularly Q 5-7 minutes for over an hour or not feeling the baby move.    RTO 4 weeks OBV  We discuss limiting carbs (pt states she has sharla tooth for gummy bears) and walking 10 minutes after meals

## 2022-11-28 NOTE — PROGRESS NOTES
This is a 29 y.o.   at 20w6d for routine OB visit. Her Estimated Due Date is 2023, by Last Menstrual Period    Denies leaking of fluid, vaginal bleeding, or regular contractions. Reports fetal movement. Current Outpatient Medications on File Prior to Visit   Medication Sig Dispense Refill    Prenatal Vit-Fe Fumarate-FA (PRENATAL VITAMIN PO) Take by mouth       No current facility-administered medications on file prior to visit.        Allergies   Allergen Reactions    Penicillins Rash       OB History    Para Term  AB Living   3 1 1 0 1 1   SAB IAB Ectopic Molar Multiple Live Births   1 0 0 0 0 1       # 1 - Date: , Sex: None, Weight: None, GA: None, Delivery: None, Apgar1: None, Apgar5: None, Living: None, Birth Comments: None    # 2 - Date: 01/10/21, Sex: Male, Weight: 7 lb 4.9 oz (3.313 kg), GA: 38w5d, Delivery: Vaginal, Spontaneous, Apgar1: 9, Apgar5: 9, Living: Living, Birth Comments: None    # 3 - Date: None, Sex: None, Weight: None, GA: None, Delivery: None, Apgar1: None, Apgar5: None, Living: None, Birth Comments: None        Past Medical History:   Diagnosis Date    Abnormal Pap smear of cervix     Anemia     Stress incontinence 2020    With sneezing       Past Surgical History:   Procedure Laterality Date    COLPOSCOPY      Have had 3 or so since the first       Family History   Problem Relation Age of Onset    Heart Surgery Paternal Grandfather     Stroke Maternal Grandmother     Breast Cancer Neg Hx     Colon Cancer Neg Hx     Ovarian Cancer Neg Hx     Uterine Cancer Neg Hx        Social History     Socioeconomic History    Marital status:      Spouse name: Not on file    Number of children: Not on file    Years of education: Not on file    Highest education level: Not on file   Occupational History    Not on file   Tobacco Use    Smoking status: Never    Smokeless tobacco: Never   Substance and Sexual Activity    Alcohol use: Not Currently     Alcohol/week: 7.0 standard drinks     Types: 2 Glasses of wine, 5 Cans of beer per week    Drug use: Never    Sexual activity: Yes     Partners: Male   Other Topics Concern    Not on file   Social History Narrative    Abuse: Feels safe at home, no history of physical abuse, no history of sexual abuse      Social Determinants of Health     Financial Resource Strain: Not on file   Food Insecurity: Not on file   Transportation Needs: Not on file   Physical Activity: Not on file   Stress: Not on file   Social Connections: Not on file   Intimate Partner Violence: Not on file   Housing Stability: Not on file           Objective    Vitals:    11/28/22 0830   BP: 102/60   Site: Left Upper Arm   Position: Sitting   Weight: 148 lb (67.1 kg)   Height: 5' 4\" (1.626 m)       General: well developed, well nourished, in no acute distress    Head: normocephalic and atraumatic    Resp: even and unlabored    Psych: Normal mood and affect        Assessment and Plan      Patient Active Problem List    Diagnosis Date Noted    History of abnormal cervical Pap smear 09/13/2022     Priority: Medium     Overview Note:     2015 LGSIL, Colpo performed with no biopsies  2017 ASCUS HPV +, Colpo CIN1  2018 Pap negative  02/22/2021 Pap negative      Multigravida in second trimester 09/01/2022     Overview Note:     EDC by LMP confirmed by 8 4/7 week US    10/03/2022: NIPT neg x3, male, CF/SMA/DMD/Fragile X  Negative  11/28/22: DAVID US wnl, AC measuring 99%. Recheck growth at 28 weeks       Assessment & Plan Note:      PTL/labor precautions, [address], and pregnancy warning signs reviewed. Pt advised to call the office at [phone] or go straight to [facility] Delivery [facility] with any of the following concerns vaginal bleeding, leaking of fluid, dusty regularly Q 5-7 minutes for over an hour or not feeling the baby move.    RTO 4 weeks OBV         Problem List Items Addressed This Visit        Other Multigravida in second trimester      PTL/labor precautions, [address], and pregnancy warning signs reviewed. Pt advised to call the office at [phone] or go straight to Labor and Delivery at [facility] with any of the following concerns vaginal bleeding, leaking of fluid, dusty regularly Q 5-7 minutes for over an hour or not feeling the baby move. RTO 4 weeks OBV         Relevant Orders    AMB POC US OB >= 14 WKS, 1ST GESTATION (Completed)   Other Visit Diagnoses     Encounter for fetal anatomic survey    -  Primary    Relevant Orders    AMB POC US OB >= 14 WKS, 1ST GESTATION (Completed)          Orders Placed This Encounter   Procedures    AMB POC US OB >= 14 WKS, 1ST GESTATION       Outpatient Encounter Medications as of 11/28/2022   Medication Sig Dispense Refill    Prenatal Vit-Fe Fumarate-FA (PRENATAL VITAMIN PO) Take by mouth       No facility-administered encounter medications on file as of 11/28/2022.                Labor signs, pregnancy warning signs, and fetal movement counting reviewed (if applicable)        Srikanth Sauer NP, APRN - CNP 11/28/22 8:39 AM

## 2022-11-28 NOTE — PROGRESS NOTES
Patient comes in today for routine prenatal visit. No complaints/concerns today.      Fetal Movement: Yes  Contractions: No  Vaginal Bleeding: No  Leaking Fluid: No  GI/: No    Vitals:    11/28/22 0830   BP: 102/60   Site: Left Upper Arm   Position: Sitting   Weight: 148 lb (67.1 kg)   Height: 5' 4\" (1.626 m)

## 2022-11-28 NOTE — PROGRESS NOTES
I have reviewed the patient's visit today including history, exam and assessment by SABI Solo. I agree with treatment/plan as above.     Nelson Overton MD  8:54 AM  11/28/22

## 2022-12-27 ENCOUNTER — ROUTINE PRENATAL (OUTPATIENT)
Dept: OBGYN CLINIC | Age: 32
End: 2022-12-27

## 2022-12-27 VITALS
SYSTOLIC BLOOD PRESSURE: 110 MMHG | WEIGHT: 149 LBS | DIASTOLIC BLOOD PRESSURE: 60 MMHG | BODY MASS INDEX: 25.44 KG/M2 | HEIGHT: 64 IN

## 2022-12-27 DIAGNOSIS — Z34.82 MULTIGRAVIDA IN SECOND TRIMESTER: Primary | ICD-10-CM

## 2022-12-27 DIAGNOSIS — Z87.42 HISTORY OF ABNORMAL CERVICAL PAP SMEAR: ICD-10-CM

## 2022-12-27 PROCEDURE — 99902 PR PRENATAL VISIT: CPT | Performed by: OBSTETRICS & GYNECOLOGY

## 2022-12-27 ASSESSMENT — PATIENT HEALTH QUESTIONNAIRE - PHQ9
SUM OF ALL RESPONSES TO PHQ QUESTIONS 1-9: 0
SUM OF ALL RESPONSES TO PHQ QUESTIONS 1-9: 0
SUM OF ALL RESPONSES TO PHQ9 QUESTIONS 1 & 2: 0
1. LITTLE INTEREST OR PLEASURE IN DOING THINGS: 0
SUM OF ALL RESPONSES TO PHQ QUESTIONS 1-9: 0
2. FEELING DOWN, DEPRESSED OR HOPELESS: 0
SUM OF ALL RESPONSES TO PHQ QUESTIONS 1-9: 0

## 2022-12-27 NOTE — PROGRESS NOTES
Chief Complaint   Patient presents with    Routine Prenatal Visit        This 29 y.o. Shereen Trivedi at 25w0d with Estimated Date of Delivery: 23 presents for routine prenatal visit. Patient has no complaints today. Pt reports good FM, no LOF, VB, ctx. Pt denies H/A, vision changes, abdom pain, N/V. Vitals:    22 0925   BP: 110/60   Site: Left Upper Arm   Position: Sitting   Weight: 149 lb (67.6 kg)   Height: 5' 4\" (1.626 m)        Patient Active Problem List    Diagnosis Date Noted    Multigravida in second trimester 2022     Priority: Medium     Overview Note:     EDC by LMP confirmed by 8 4/7 week US    10/03/2022: NIPT neg x3, male, CF/SMA/DMD/Fragile X  Negative  22: DAVID US wnl, AC measuring 99%. Recheck growth at 28 weeks       Assessment & Plan Note:     Educated patient of signs and symptoms of  labor including but not limited to regular uterine contractions every 5-7 minutes for 1 hour, vaginal bleeding or leakage of fluid to seek immediate care.  History of abnormal cervical Pap smear 2022     Overview Note:      LGSIL, Colpo performed with no biopsies  2017 ASCUS HPV +, Colpo CIN1  2018 Pap negative  2021 Pap negative       Assessment & Plan Note:     noted        Problem List Items Addressed This Visit        Other    [place] in second trimester - Primary     Educated patient of signs and symptoms of  labor including but not limited to regular uterine contractions every 5-7 minutes for 1 hour, vaginal bleeding or leakage of fluid to seek immediate care.           History of abnormal cervical Pap smear     noted             Anne Marie Kern MD     10:02 AM    22

## 2023-01-17 ENCOUNTER — ROUTINE PRENATAL (OUTPATIENT)
Dept: OBGYN CLINIC | Age: 33
End: 2023-01-17
Payer: COMMERCIAL

## 2023-01-17 VITALS
DIASTOLIC BLOOD PRESSURE: 60 MMHG | WEIGHT: 156 LBS | BODY MASS INDEX: 26.63 KG/M2 | HEIGHT: 64 IN | SYSTOLIC BLOOD PRESSURE: 100 MMHG

## 2023-01-17 DIAGNOSIS — O36.63X0 EXCESSIVE FETAL GROWTH AFFECTING MANAGEMENT OF PREGNANCY IN THIRD TRIMESTER, SINGLE OR UNSPECIFIED FETUS: Primary | ICD-10-CM

## 2023-01-17 DIAGNOSIS — Z34.83 MULTIGRAVIDA IN THIRD TRIMESTER: ICD-10-CM

## 2023-01-17 LAB
ERYTHROCYTE [DISTWIDTH] IN BLOOD BY AUTOMATED COUNT: 12.8 % (ref 11.9–14.6)
HCT VFR BLD AUTO: 34 % (ref 35.8–46.3)
HGB BLD-MCNC: 10.7 G/DL (ref 11.7–15.4)
MCH RBC QN AUTO: 30 PG (ref 26.1–32.9)
MCHC RBC AUTO-ENTMCNC: 31.5 G/DL (ref 31.4–35)
MCV RBC AUTO: 95.2 FL (ref 82–102)
NRBC # BLD: 0 K/UL (ref 0–0.2)
PLATELET # BLD AUTO: 227 K/UL (ref 150–450)
PMV BLD AUTO: 11.9 FL (ref 9.4–12.3)
RBC # BLD AUTO: 3.57 M/UL (ref 4.05–5.2)
WBC # BLD AUTO: 11.2 K/UL (ref 4.3–11.1)

## 2023-01-17 PROCEDURE — 76816 OB US FOLLOW-UP PER FETUS: CPT | Performed by: NURSE PRACTITIONER

## 2023-01-17 PROCEDURE — 99902 PR PRENATAL VISIT: CPT | Performed by: NURSE PRACTITIONER

## 2023-01-17 ASSESSMENT — PATIENT HEALTH QUESTIONNAIRE - PHQ9
SUM OF ALL RESPONSES TO PHQ QUESTIONS 1-9: 0
2. FEELING DOWN, DEPRESSED OR HOPELESS: 0
SUM OF ALL RESPONSES TO PHQ QUESTIONS 1-9: 0
1. LITTLE INTEREST OR PLEASURE IN DOING THINGS: 0
SUM OF ALL RESPONSES TO PHQ9 QUESTIONS 1 & 2: 0

## 2023-01-17 NOTE — ASSESSMENT & PLAN NOTE
PTL/labor precautions, [facility], and pregnancy warning signs reviewed. Pt advised to call the office at [phone] or go straight to Labor and Delivery at [facility] with any of the following concerns vaginal bleeding, leaking of fluid, dusty regularly Q 5-7 minutes for over an hour or not feeling the baby move.    Scar, cbc today  RTO 2 weeks OBV, tdap

## 2023-01-17 NOTE — PROGRESS NOTES
I have reviewed the patient's visit today including history, exam and assessment by SABI Mann. I agree with treatment/plan as above.     Niraj Villanueva MD  11:01 AM  01/17/23

## 2023-01-17 NOTE — PROGRESS NOTES
This is a 32 y.o.   at 28w0d for routine OB visit.    Her Estimated Due Date is 2023, by Last Menstrual Period    Denies leaking of fluid, vaginal bleeding, or regular contractions. Reports fetal movement.     Current Outpatient Medications on File Prior to Visit   Medication Sig Dispense Refill   • Prenatal Vit-Fe Fumarate-FA (PRENATAL VITAMIN PO) Take by mouth       No current facility-administered medications on file prior to visit.       Allergies   Allergen Reactions   • Penicillins Rash       OB History    Para Term  AB Living   3 1 1 0 1 1   SAB IAB Ectopic Molar Multiple Live Births   1 0 0 0 0 1       # 1 - Date: , Sex: None, Weight: None, GA: None, Delivery: None, Apgar1: None, Apgar5: None, Living: None, Birth Comments: None    # 2 - Date: 01/10/21, Sex: Male, Weight: 7 lb 4.9 oz (3.313 kg), GA: 38w5d, Delivery: Vaginal, Spontaneous, Apgar1: 9, Apgar5: 9, Living: Living, Birth Comments: None    # 3 - Date: None, Sex: None, Weight: None, GA: None, Delivery: None, Apgar1: None, Apgar5: None, Living: None, Birth Comments: None        Past Medical History:   Diagnosis Date   • Abnormal Pap smear of cervix    • Anemia    • Stress incontinence     With sneezing       Past Surgical History:   Procedure Laterality Date   • COLPOSCOPY      Have had 3 or so since the first       Family History   Problem Relation Age of Onset   • Heart Surgery Paternal Grandfather    • Stroke Maternal Grandmother    • Breast Cancer Neg Hx    • Colon Cancer Neg Hx    • Ovarian Cancer Neg Hx    • Uterine Cancer Neg Hx        Social History     Socioeconomic History   • Marital status:      Spouse name: Not on file   • Number of children: Not on file   • Years of education: Not on file   • Highest education level: Not on file   Occupational History   • Not on file   Tobacco Use   • Smoking status: Never   • Smokeless tobacco: Never   Substance and Sexual Activity   • Alcohol use: Not  Currently     Alcohol/week: 7.0 standard drinks     Types: 2 Glasses of wine, 5 Cans of beer per week    Drug use: Never    Sexual activity: Yes     Partners: Male   Other Topics Concern    Not on file   Social History Narrative    Abuse: Feels safe at home, no history of physical abuse, no history of sexual abuse      Social Determinants of Health     Financial Resource Strain: Not on file   Food Insecurity: Not on file   Transportation Needs: Not on file   Physical Activity: Not on file   Stress: Not on file   Social Connections: Not on file   Intimate Partner Violence: Not on file   Housing Stability: Not on file           Objective    Vitals:    01/17/23 0832   BP: 100/60   Site: Left Upper Arm   Position: Sitting   Weight: 156 lb (70.8 kg)   Height: 5' 4\" (1.626 m)       General: well developed, well nourished, in no acute distress    Head: normocephalic and atraumatic    Resp: even and unlabored    Psych: Normal mood and affect        Assessment and Plan      Patient Active Problem List    Diagnosis Date Noted    Excessive fetal growth affecting management of mother in third trimester, antepartum 01/17/2023     Priority: Medium     Overview Note:     1/17/2023: EFW 87%, AC 89%, MICHELLE nl, vertex, limit carbs. glucola today      History of abnormal cervical Pap smear 09/13/2022     Priority: Medium     Overview Note:     2015 LGSIL, Colpo performed with no biopsies  2017 ASCUS HPV +, Colpo CIN1  2018 Pap negative  02/22/2021 Pap negative      Multigravida in third trimester 09/01/2022     Overview Note:     EDC by LMP confirmed by 8 4/7 week US    10/03/2022: NIPT neg x3, male, CF/SMA/DMD/Fragile X  Negative  11/28/22: DAVID US wnl, AC measuring 99%. Recheck growth at 28 weeks       Assessment & Plan Note:     PTL/labor precautions, [address], and pregnancy warning signs reviewed.  Pt advised to call the office at [phone] or go straight to Labor and Delivery at [facility] with any of the following concerns vaginal bleeding, leaking of fluid, dusty regularly Q 5-7 minutes for over an hour or not feeling the baby move. Glucola, cbc today  RTO 2 weeks OBV, tdap         Problem List Items Addressed This Visit        Other    Excessive fetal growth affecting management of mother in third trimester, antepartum - Primary    Relevant Orders    AMB POC US OB RE-EVAL/FOLLOW UP (Completed)    Multigravida in third trimester     PTL/labor precautions, [facility], and pregnancy warning signs reviewed. Pt advised to call the office at [phone] or go straight to Labor and Delivery at [facility] with any of the following concerns vaginal bleeding, leaking of fluid, dusty regularly Q 5-7 minutes for over an hour or not feeling the baby move. Glucola, cbc today  RTO 2 weeks OBV, tdap         Relevant Orders    Glucose tolerance, 1 hour    CBC    AMB POC US OB RE-EVAL/FOLLOW UP (Completed)       Orders Placed This Encounter   Procedures    AMB POC US OB RE-EVAL/FOLLOW UP    Glucose tolerance, 1 hour    CBC       Outpatient Encounter Medications as of 1/17/2023   Medication Sig Dispense Refill    Prenatal Vit-Fe Fumarate-FA (PRENATAL VITAMIN PO) Take by mouth       No facility-administered encounter medications on file as of 1/17/2023.                Labor signs, pregnancy warning signs, and fetal movement counting reviewed (if applicable)        Venkata Kang NP, APRN - CNP 01/17/23 9:23 AM

## 2023-01-17 NOTE — PROGRESS NOTES
Patient comes in today for routine prenatal visit. No complaints/concerns today.      Finished Glucola @ 8:34 am    Fetal Movement: Yes  Contractions: No  Vaginal Bleeding: No  Leaking Fluid: No  GI/: No    Vitals:    01/17/23 0832   BP: 100/60   Site: Left Upper Arm   Position: Sitting   Weight: 156 lb (70.8 kg)   Height: 5' 4\" (1.626 m)

## 2023-01-18 ENCOUNTER — TELEPHONE (OUTPATIENT)
Dept: OBGYN CLINIC | Age: 33
End: 2023-01-18

## 2023-01-18 DIAGNOSIS — O99.013 ANEMIA AFFECTING PREGNANCY IN THIRD TRIMESTER: ICD-10-CM

## 2023-01-18 LAB — GLUCOSE 1 HOUR: 129 MG/DL

## 2023-01-18 NOTE — TELEPHONE ENCOUNTER
Please call patient and let her know that her hemoglobin was low. She needs to start taking FeSO4 325mg PO BID. Also encourage foods that are high in iron. Patient can take Colace 100mg PO BID prn for constipation. Encourage an increase in fluids and foods high in fiber to prevent constipation. 2. Passed glucola. But given baby measuring in the 97%, I recommend she limit the amount of sugar/ simple carbohydrates (sugary drinks/snacks/white rice/pasta, etc) she is eating and drinking. Recommend walking 10 minutes after meals as well.

## 2023-01-18 NOTE — TELEPHONE ENCOUNTER
Patient read message at 3:56 PM on 1/18/2023.     Patient responded to RN message and stated they will  iron and colace OTC at [facility].

## 2023-01-31 ENCOUNTER — ROUTINE PRENATAL (OUTPATIENT)
Dept: OBGYN CLINIC | Age: 33
End: 2023-01-31
Payer: COMMERCIAL

## 2023-01-31 VITALS
HEIGHT: 64 IN | WEIGHT: 156 LBS | DIASTOLIC BLOOD PRESSURE: 58 MMHG | BODY MASS INDEX: 26.63 KG/M2 | SYSTOLIC BLOOD PRESSURE: 104 MMHG

## 2023-01-31 DIAGNOSIS — Z34.83 MULTIGRAVIDA IN THIRD TRIMESTER: Primary | ICD-10-CM

## 2023-01-31 DIAGNOSIS — O99.013 ANEMIA AFFECTING PREGNANCY IN THIRD TRIMESTER: ICD-10-CM

## 2023-01-31 PROCEDURE — 90715 TDAP VACCINE 7 YRS/> IM: CPT | Performed by: NURSE PRACTITIONER

## 2023-01-31 PROCEDURE — 99902 PR PRENATAL VISIT: CPT | Performed by: NURSE PRACTITIONER

## 2023-01-31 PROCEDURE — 90471 IMMUNIZATION ADMIN: CPT | Performed by: NURSE PRACTITIONER

## 2023-01-31 NOTE — PROGRESS NOTES
This is a 29 y.o.   at 30w0d for routine OB visit. Her Estimated Due Date is 2023, by Last Menstrual Period    Denies leaking of fluid, vaginal bleeding, or regular contractions. Reports fetal movement. Current Outpatient Medications on File Prior to Visit   Medication Sig Dispense Refill    Iron, Ferrous Sulfate, 325 (65 Fe) MG TABS Take by mouth 2 times daily      Prenatal Vit-Fe Fumarate-FA (PRENATAL VITAMIN PO) Take by mouth       No current facility-administered medications on file prior to visit.        Allergies   Allergen Reactions    Penicillins Rash       OB History    Para Term  AB Living   3 1 1 0 1 1   SAB IAB Ectopic Molar Multiple Live Births   1 0 0 0 0 1       # 1 - Date: , Sex: None, Weight: None, GA: None, Delivery: None, Apgar1: None, Apgar5: None, Living: None, Birth Comments: None    # 2 - Date: [birth year]21, Sex: Male, Weight: 7 lb 4.9 oz (3.313 kg), GA: 38w5d, Delivery: Vaginal, Spontaneous, Apgar1: 9, Apgar5: 9, Living: Living, Birth Comments: None    # 3 - Date: None, Sex: None, Weight: None, GA: None, Delivery: None, Apgar1: None, Apgar5: None, Living: None, Birth Comments: None        Past Medical History:   Diagnosis Date    Abnormal Pap smear of cervix     Anemia     Stress incontinence     With sneezing       Past Surgical History:   Procedure Laterality Date    COLPOSCOPY      Have had 3 or so since the first       Family History   Problem Relation Age of Onset    Heart Surgery Paternal Grandfather     Stroke Maternal Grandmother     Breast Cancer Neg Hx     Colon Cancer Neg Hx     Ovarian Cancer Neg Hx     Uterine Cancer Neg Hx        Social History     Socioeconomic History    Marital status:      Spouse name: Not on file    Number of children: Not on file    Years of education: Not on file    Highest education level: Not on file   Occupational History    Not on file   Tobacco Use    Smoking status: Never    Smokeless tobacco: Never   Substance and Sexual Activity    Alcohol use: Not Currently     Alcohol/week: 7.0 standard drinks     Types: 2 Glasses of wine, 5 Cans of beer per week    Drug use: Never    Sexual activity: Yes     Partners: Male   Other Topics Concern    Not on file   Social History Narrative    Abuse: Feels safe at home, no history of physical abuse, no history of sexual abuse      Social Determinants of Health     Financial Resource Strain: Not on file   Food Insecurity: Not on file   Transportation Needs: Not on file   Physical Activity: Not on file   Stress: Not on file   Social Connections: Not on file   Intimate Partner Violence: Not on file   Housing Stability: Not on file           Objective    Vitals:    01/31/23 1454   BP: (!) 104/58   Site: Left Upper Arm   Position: Sitting   Weight: 156 lb (70.8 kg)   Height: 5' 4\" (1.626 m)       General: well developed, well nourished, in no acute distress    Head: normocephalic and atraumatic    Resp: even and unlabored    Psych: Normal mood and affect        Assessment and Plan      Patient Active Problem List    Diagnosis Date Noted    Anemia affecting pregnancy in third trimester 01/18/2023     Priority: Medium     Overview Note:     Add'l iron       Assessment & Plan Note:     noted      Excessive fetal growth affecting management of mother in third trimester, antepartum 01/17/2023     Priority: Medium     Overview Note:     1/17/2023: EFW 87%, AC 89%, MICHELLE nl, vertex, limit carbs. glucola today      History of abnormal cervical Pap smear 09/13/2022     Priority: Medium     Overview Note:     2015 LGSIL, Colpo performed with no biopsies  2017 ASCUS HPV +, Colpo CIN1  2018 Pap negative  02/22/2021 Pap negative      Multigravida in third trimester 09/01/2022     Overview Note:     EDC by LMP confirmed by 8 4/7 week US    10/03/2022: NIPT neg x3, male, CF/SMA/DMD/Fragile X  Negative  11/28/22: DAVID US wnl, AC measuring 99%.  Recheck growth at 29 weeks  1/17/2023: EFW 87%, AC 89%, MICHELLE nl, vertex, limit carbs. glucola today    1/31/2023: tdap given today       Assessment & Plan Note:     PTL/labor precautions, [facility], and pregnancy warning signs reviewed. Pt advised to call the office at [phone] or go straight to Labor and Delivery at [address] with any of the following concerns vaginal bleeding, leaking of fluid, dusty regularly Q 5-7 minutes for over an hour or not feeling the baby move. RTO 2 week obv, us         Problem List Items Addressed This Visit        Other    Anemia affecting pregnancy in third trimester     noted         Multigravida in third trimester - Primary     PTL/labor precautions, [facility], and pregnancy warning signs reviewed. Pt advised to call the office at [phone] or go straight to Labor and Delivery at [address] with any of the following concerns vaginal bleeding, leaking of fluid, dusty regularly Q 5-7 minutes for over an hour or not feeling the baby move. RTO 2 week obv, us         Relevant Orders    MI IM ADM PRQ ID SUBQ/IM NJXS 1 VACCINE    Tdap, BOOSTRIX, (age 8 yrs+), IM (Completed)       Orders Placed This Encounter   Procedures    Tdap, BOOSTRIX, (age 8 yrs+), IM    MI IM ADM PRQ ID SUBQ/IM NJXS 1 VACCINE       Outpatient Encounter Medications as of 1/31/2023   Medication Sig Dispense Refill    Iron, Ferrous Sulfate, 325 (65 Fe) MG TABS Take by mouth 2 times daily      Prenatal Vit-Fe Fumarate-FA (PRENATAL VITAMIN PO) Take by mouth       No facility-administered encounter medications on file as of 1/31/2023.                Labor signs, pregnancy warning signs, and fetal movement counting reviewed (if applicable)        Ivis Dong NP, APRN - CNP 01/31/23 3:36 PM

## 2023-01-31 NOTE — PROGRESS NOTES
Patient comes in today for routine prenatal visit. No complaints/concerns today.      Fetal Movement: Yes  Contractions: No  Vaginal Bleeding: No  Leaking Fluid: No  GI/: No    Vitals:    01/31/23 1454   BP: (!) 104/58   Site: Left Upper Arm   Position: Sitting   Weight: 156 lb (70.8 kg)   Height: 5' 4\" (1.626 m)

## 2023-01-31 NOTE — ASSESSMENT & PLAN NOTE
PTL/labor precautions, [address], and pregnancy warning signs reviewed. Pt advised to call the office at [phone] or go straight to Labor and Delivery at [facility] with any of the following concerns vaginal bleeding, leaking of fluid, dusty regularly Q 5-7 minutes for over an hour or not feeling the baby move.    RTO 2 week obv, us

## 2023-02-01 NOTE — PROGRESS NOTES
I have reviewed the patient's visit today including history, exam and assessment by SABI Nicholas. I agree with treatment/plan as above.     Gregory Sun MD  11:15 AM  02/01/23

## 2023-02-08 ENCOUNTER — TELEPHONE (OUTPATIENT)
Dept: OBGYN CLINIC | Age: 33
End: 2023-02-08

## 2023-02-08 NOTE — TELEPHONE ENCOUNTER
RN called patient, patient verified PHIs, RN asked patient to clarify their concerns of possible stomach bug. Patient states that they have been experiencing n/v since 9:30am this morning. Patient denies, fever, aches/chills, severe abdominal pain, and bleeding. Patient states she has been drinking Pedialyte and water to stay hydrated. RN educated patient per PN booklet how to prevent dehydration and what to do if dehydrated or experiencing abnormal s/s (seek ER or urgent care if experiencing fever, aches/chills, severe abdominal pain, and/or soaking through a pad or more in an hour or less). .   Patient verbalized understanding and confirms she will keep office staff updated with n/v.

## 2023-02-16 ENCOUNTER — ROUTINE PRENATAL (OUTPATIENT)
Dept: OBGYN CLINIC | Age: 33
End: 2023-02-16

## 2023-02-16 VITALS
WEIGHT: 156 LBS | HEIGHT: 64 IN | DIASTOLIC BLOOD PRESSURE: 60 MMHG | SYSTOLIC BLOOD PRESSURE: 110 MMHG | BODY MASS INDEX: 26.63 KG/M2

## 2023-02-16 DIAGNOSIS — O99.013 ANEMIA AFFECTING PREGNANCY IN THIRD TRIMESTER: ICD-10-CM

## 2023-02-16 DIAGNOSIS — O36.63X0 EXCESSIVE FETAL GROWTH AFFECTING MANAGEMENT OF PREGNANCY IN THIRD TRIMESTER, SINGLE OR UNSPECIFIED FETUS: Primary | ICD-10-CM

## 2023-02-16 DIAGNOSIS — Z34.83 MULTIGRAVIDA IN THIRD TRIMESTER: ICD-10-CM

## 2023-02-16 SDOH — ECONOMIC STABILITY: FOOD INSECURITY: WITHIN THE PAST 12 MONTHS, YOU WORRIED THAT YOUR FOOD WOULD RUN OUT BEFORE YOU GOT MONEY TO BUY MORE.: NEVER TRUE

## 2023-02-16 SDOH — ECONOMIC STABILITY: INCOME INSECURITY: HOW HARD IS IT FOR YOU TO PAY FOR THE VERY BASICS LIKE FOOD, HOUSING, MEDICAL CARE, AND HEATING?: NOT HARD AT ALL

## 2023-02-16 SDOH — ECONOMIC STABILITY: FOOD INSECURITY: WITHIN THE PAST 12 MONTHS, THE FOOD YOU BOUGHT JUST DIDN'T LAST AND YOU DIDN'T HAVE MONEY TO GET MORE.: NEVER TRUE

## 2023-02-16 SDOH — ECONOMIC STABILITY: HOUSING INSECURITY
IN THE LAST 12 MONTHS, WAS THERE A TIME WHEN YOU DID NOT HAVE A STEADY PLACE TO SLEEP OR SLEPT IN A SHELTER (INCLUDING NOW)?: NO

## 2023-02-16 ASSESSMENT — PATIENT HEALTH QUESTIONNAIRE - PHQ9
SUM OF ALL RESPONSES TO PHQ QUESTIONS 1-9: 0
2. FEELING DOWN, DEPRESSED OR HOPELESS: 0
SUM OF ALL RESPONSES TO PHQ QUESTIONS 1-9: 0
SUM OF ALL RESPONSES TO PHQ9 QUESTIONS 1 & 2: 0
1. LITTLE INTEREST OR PLEASURE IN DOING THINGS: 0
SUM OF ALL RESPONSES TO PHQ QUESTIONS 1-9: 0
SUM OF ALL RESPONSES TO PHQ QUESTIONS 1-9: 0

## 2023-02-16 NOTE — ASSESSMENT & PLAN NOTE
PTL/labor precautions, [address], and pregnancy warning signs reviewed. Pt advised to call the office at [phone] or go straight to Labor and Delivery at [facility] with any of the following concerns vaginal bleeding, leaking of fluid, dusty regularly Q 5-7 minutes for over an hour or not feeling the baby move.    RTO 2 weeks OBV

## 2023-02-16 NOTE — PROGRESS NOTES
Patient comes in today for routine prenatal visit. No complaints/concerns today.      Fetal Movement: Yes  Contractions: No  Vaginal Bleeding: No  Leaking Fluid: No  GI/: No    Vitals:    02/16/23 1114   BP: 110/60   Site: Left Upper Arm   Position: Sitting   Weight: 156 lb (70.8 kg)   Height: 5' 4\" (1.626 m)

## 2023-02-16 NOTE — PROGRESS NOTES
This is a 29 y.o.   at [place] [address] for routine OB visit. Her Estimated Due Date is 2023, by Last Menstrual Period    Denies leaking of fluid, vaginal bleeding, or regular contractions. Reports fetal movement. Current Outpatient Medications on File Prior to Visit   Medication Sig Dispense Refill    Iron, Ferrous Sulfate, 325 (65 Fe) MG TABS Take by mouth 2 times daily      Prenatal Vit-Fe Fumarate-FA (PRENATAL VITAMIN PO) Take by mouth       No current facility-administered medications on file prior to visit.        Allergies   Allergen Reactions    Penicillins Rash       OB History    Para Term  AB Living   3 1 1 0 1 1   SAB IAB Ectopic Molar Multiple Live Births   1 0 0 0 0 1       # 1 - Date: , Sex: None, Weight: None, GA: None, Delivery: None, Apgar1: None, Apgar5: None, Living: None, Birth Comments: None    # 2 - Date: [birth year], Sex: Male, Weight: 7 lb 4.9 oz (3.313 kg), GA: 38w5d, Delivery: Vaginal, Spontaneous, Apgar1: 9, Apgar5: 9, Living: Living, Birth Comments: None    # 3 - Date: None, Sex: None, Weight: None, GA: None, Delivery: None, Apgar1: None, Apgar5: None, Living: None, Birth Comments: None        Past Medical History:   Diagnosis Date    Abnormal Pap smear of cervix     Anemia     Stress incontinence     With sneezing       Past Surgical History:   Procedure Laterality Date    COLPOSCOPY      Have had 3 or so since the first       Family History   Problem Relation Age of Onset    Heart Surgery Paternal Grandfather     Stroke Maternal Grandmother     Breast Cancer Neg Hx     Colon Cancer Neg Hx     Ovarian Cancer Neg Hx     Uterine Cancer Neg Hx        Social History     Socioeconomic History    Marital status:      Spouse name: Not on file    Number of children: Not on file    Years of education: Not on file    Highest education level: Not on file   Occupational History    Not on file   Tobacco Use    Smoking status: Never    Smokeless tobacco: Never   Substance and Sexual Activity    Alcohol use: Not Currently     Alcohol/week: 7.0 standard drinks     Types: 2 Glasses of wine, 5 Cans of beer per week    Drug use: Never    Sexual activity: Yes     Partners: Male   Other Topics Concern    Not on file   Social History Narrative    Abuse: Feels safe at home, no history of physical abuse, no history of sexual abuse      Social Determinants of Health     Financial Resource Strain: Low Risk     Difficulty of Paying Living Expenses: Not hard at all   Food Insecurity: No Food Insecurity    Worried About Running Out of Food in the Last Year: Never true    Caleb of Food in the Last Year: Never true   Transportation Needs: Unknown    Lack of Transportation (Medical): Not on file    Lack of Transportation (Non-Medical): No   Physical Activity: Not on file   Stress: Not on file   Social Connections: Not on file   Intimate Partner Violence: Not on file   Housing Stability: Unknown    Unable to Pay for Housing in the Last Year: Not on file    Number of Places Lived in the Last Year: Not on file    Unstable Housing in the Last Year: No           Objective    Vitals:    02/16/23 1114   BP: 110/60   Site: Left Upper Arm   Position: Sitting   Weight: 156 lb (70.8 kg)   Height: 5' 4\" (1.626 m)       General: well developed, well nourished, in no acute distress    Head: normocephalic and atraumatic    Resp: even and unlabored    Psych: Normal mood and affect        Assessment and Plan      Patient Active Problem List    Diagnosis Date Noted    Anemia affecting pregnancy in third trimester 01/18/2023     Priority: Medium     Overview Note:     Add'l iron       Assessment & Plan Note:     noted      Excessive fetal growth affecting management of mother in third trimester, antepartum 01/17/2023     Priority: Medium     Overview Note:     1/17/2023: EFW 87%, AC 89%, MICHELLE nl, vertex, limit carbs.  glucola today  2/16/2023: EFW 55%, AC 50%, MICHELLE nl, vertex      History of abnormal cervical Pap smear 09/13/2022     Priority: Medium     Overview Note:     2015 LGSIL, Colpo performed with no biopsies  2017 ASCUS HPV +, Colpo CIN1  2018 Pap negative  02/22/2021 Pap negative      Multigravida in third trimester 09/01/2022     Overview Note:     EDC by LMP confirmed by 8 4/7 week US    10/03/2022: NIPT neg x3, male, CF/SMA/DMD/Fragile X  Negative  11/28/22: DAVID US wnl, AC measuring 99%. Recheck growth at 28 weeks  1/17/2023: EFW 87%, AC 89%, MICHELLE nl, vertex, limit carbs. glucola today    1/31/2023: tdap given today       Assessment & Plan Note:     PTL/labor precautions, [address], and pregnancy warning signs reviewed. Pt advised to call the office at [phone] or go straight to Labor and Delivery at [facility] with any of the following concerns vaginal bleeding, leaking of fluid, dusty regularly Q 5-7 minutes for over an hour or not feeling the baby move. RTO 2 weeks OBV         Problem List Items Addressed This Visit        Other    Anemia affecting pregnancy in third trimester     noted         Excessive fetal growth affecting management of mother in third trimester, antepartum - Primary    Relevant Orders    AMB POC US OB RE-EVAL/FOLLOW UP (Completed)    Multigravida in third trimester     PTL/labor precautions, [address], and pregnancy warning signs reviewed. Pt advised to call the office at [phone] or go straight to Labor and Delivery at [facility] with any of the following concerns vaginal bleeding, leaking of fluid, dusty regularly Q 5-7 minutes for over an hour or not feeling the baby move.    RTO 2 weeks OBV         Relevant Orders    AMB POC US OB RE-EVAL/FOLLOW UP (Completed)       Orders Placed This Encounter   Procedures    AMB POC US OB RE-EVAL/FOLLOW UP       Outpatient Encounter Medications as of 2/16/2023   Medication Sig Dispense Refill    Iron, Ferrous Sulfate, 325 (65 Fe) MG TABS Take by mouth 2 times daily      Prenatal Vit-Fe Fumarate-FA (PRENATAL VITAMIN PO) Take by mouth       No facility-administered encounter medications on file as of 2/16/2023.                Labor signs, pregnancy warning signs, and fetal movement counting reviewed (if applicable)        Jared Scott NP, APRN - CNP 02/16/23 11:36 AM

## 2023-03-02 ENCOUNTER — ROUTINE PRENATAL (OUTPATIENT)
Dept: OBGYN CLINIC | Age: 33
End: 2023-03-02

## 2023-03-02 VITALS
SYSTOLIC BLOOD PRESSURE: 118 MMHG | WEIGHT: 160 LBS | HEIGHT: 64 IN | BODY MASS INDEX: 27.31 KG/M2 | DIASTOLIC BLOOD PRESSURE: 68 MMHG

## 2023-03-02 DIAGNOSIS — O36.63X0 EXCESSIVE FETAL GROWTH AFFECTING MANAGEMENT OF PREGNANCY IN THIRD TRIMESTER, SINGLE OR UNSPECIFIED FETUS: ICD-10-CM

## 2023-03-02 DIAGNOSIS — O99.013 ANEMIA AFFECTING PREGNANCY IN THIRD TRIMESTER: ICD-10-CM

## 2023-03-02 DIAGNOSIS — Z34.83 MULTIGRAVIDA IN THIRD TRIMESTER: ICD-10-CM

## 2023-03-02 ASSESSMENT — PATIENT HEALTH QUESTIONNAIRE - PHQ9
SUM OF ALL RESPONSES TO PHQ QUESTIONS 1-9: 0
SUM OF ALL RESPONSES TO PHQ9 QUESTIONS 1 & 2: 0
SUM OF ALL RESPONSES TO PHQ QUESTIONS 1-9: 0
1. LITTLE INTEREST OR PLEASURE IN DOING THINGS: 0
2. FEELING DOWN, DEPRESSED OR HOPELESS: 0
SUM OF ALL RESPONSES TO PHQ QUESTIONS 1-9: 0
SUM OF ALL RESPONSES TO PHQ QUESTIONS 1-9: 0

## 2023-03-02 NOTE — PROGRESS NOTES
Patient comes in today for routine prenatal visit. No complaints/concerns today.      Fetal Movement: Yes  Contractions: No  Vaginal Bleeding: No  Leaking Fluid: No  GI/: No    Vitals:    03/02/23 1446   BP: 118/68   Site: Right Upper Arm   Position: Sitting   Weight: 160 lb (72.6 kg)   Height: 5' 4\" (1.626 m)

## 2023-03-02 NOTE — PROGRESS NOTES
This is a 32 y.o.   at 34w2d for routine OB visit.    Her Estimated Due Date is 2023, by Last Menstrual Period    Denies leaking of fluid, vaginal bleeding, or regular contractions. Reports fetal movement.     Current Outpatient Medications on File Prior to Visit   Medication Sig Dispense Refill    Iron, Ferrous Sulfate, 325 (65 Fe) MG TABS Take by mouth 2 times daily      Prenatal Vit-Fe Fumarate-FA (PRENATAL VITAMIN PO) Take by mouth       No current facility-administered medications on file prior to visit.       Allergies   Allergen Reactions    Penicillins Rash       OB History    Para Term  AB Living   3 1 1 0 1 1   SAB IAB Ectopic Molar Multiple Live Births   1 0 0 0 0 1       # 1 - Date: , Sex: None, Weight: None, GA: None, Delivery: None, Apgar1: None, Apgar5: None, Living: None, Birth Comments: None    # 2 - Date: 01/10/21, Sex: Male, Weight: 7 lb 4.9 oz (3.313 kg), GA: 38w5d, Delivery: Vaginal, Spontaneous, Apgar1: 9, Apgar5: 9, Living: Living, Birth Comments: None    # 3 - Date: None, Sex: None, Weight: None, GA: None, Delivery: None, Apgar1: None, Apgar5: None, Living: None, Birth Comments: None        Past Medical History:   Diagnosis Date    Abnormal Pap smear of cervix     Anemia     Stress incontinence     With sneezing       Past Surgical History:   Procedure Laterality Date    COLPOSCOPY      Have had 3 or so since the first       Family History   Problem Relation Age of Onset    Heart Surgery Paternal Grandfather     Stroke Maternal Grandmother     Breast Cancer Neg Hx     Colon Cancer Neg Hx     Ovarian Cancer Neg Hx     Uterine Cancer Neg Hx        Social History     Socioeconomic History    Marital status:      Spouse name: Not on file    Number of children: Not on file    Years of education: Not on file    Highest education level: Not on file   Occupational History    Not on file   Tobacco Use    Smoking status: Never    Smokeless tobacco:  Never   Substance and Sexual Activity    Alcohol use: Not Currently     Alcohol/week: 7.0 standard drinks     Types: 2 Glasses of wine, 5 Cans of beer per week    Drug use: Never    Sexual activity: Yes     Partners: Male   Other Topics Concern    Not on file   Social History Narrative    Abuse: Feels safe at home, no history of physical abuse, no history of sexual abuse      Social Determinants of Health     Financial Resource Strain: Low Risk     Difficulty of Paying Living Expenses: Not hard at all   Food Insecurity: No Food Insecurity    Worried About Running Out of Food in the Last Year: Never true    Ran Out of Food in the Last Year: Never true   Transportation Needs: Unknown    Lack of Transportation (Medical): Not on file    Lack of Transportation (Non-Medical): No   Physical Activity: Not on file   Stress: Not on file   Social Connections: Not on file   Intimate Partner Violence: Not on file   Housing Stability: Unknown    Unable to Pay for Housing in the Last Year: Not on file    Number of Places Lived in the Last Year: Not on file    Unstable Housing in the Last Year: No           Objective    Vitals:    03/02/23 1446   BP: 118/68   Site: Right Upper Arm   Position: Sitting   Weight: 160 lb (72.6 kg)   Height: 5' 4\" (1.626 m)       General: well developed, well nourished, in no acute distress    Head: normocephalic and atraumatic    Resp: even and unlabored    Psych: Normal mood and affect        Assessment and Plan      Patient Active Problem List    Diagnosis Date Noted    Anemia affecting pregnancy in third trimester 01/18/2023     Priority: Medium     Overview Note:     Add'l iron       Assessment & Plan Note:     Cbc next visit      Excessive fetal growth affecting management of mother in third trimester, antepartum 01/17/2023     Priority: Medium     Overview Note:     1/17/2023: EFW 87%, AC 89%, MICHELLE nl, vertex, limit carbs. glucola today  2/16/2023: EFW 55%, AC 50%, MICHELLE nl, vertex       Assessment  & Plan Note:     noted      History of abnormal cervical Pap smear 09/13/2022     Priority: Medium     Overview Note:     2015 LGSIL, Colpo performed with no biopsies  2017 ASCUS HPV +, Colpo CIN1  2018 Pap negative  02/22/2021 Pap negative      Multigravida in third trimester 09/01/2022     Overview Note:     EDC by LMP confirmed by 8 4/7 week US    10/03/2022: NIPT neg x3, male, CF/SMA/DMD/Fragile X  Negative  11/28/22: DAVID US wnl, AC measuring 99%. Recheck growth at 28 weeks  1/17/2023: EFW 87%, AC 89%, MICHELLE nl, vertex, limit carbs. glucola today    1/31/2023: tdap given today       Assessment & Plan Note:     PTL/labor precautions, [address] [place], and pregnancy warning signs reviewed. Pt advised to call the office at [phone] or go straight to Labor and Delivery at [facility] with any of the following concerns vaginal bleeding, leaking of fluid, dusty regularly Q 5-7 minutes for over an hour or not feeling the baby move. RTO 2 weeks OBV, US, GBS, CBC         Problem List Items Addressed This Visit          Other    Anemia affecting pregnancy in third trimester     Cbc next visit         Excessive fetal growth affecting management of mother in third trimester, antepartum     noted         Multigravida in third trimester     PTL/labor precautions, [address] [place], and pregnancy warning signs reviewed. Pt advised to call the office at [phone] or go straight to Labor and Delivery at [facility] with any of the following concerns vaginal bleeding, leaking of fluid, dusty regularly Q 5-7 minutes for over an hour or not feeling the baby move. RTO 2 weeks OBV, US, GBS, CBC            No orders of the defined types were placed in this encounter.       Outpatient Encounter Medications as of 3/2/2023   Medication Sig Dispense Refill    Iron, Ferrous Sulfate, 325 (65 Fe) MG TABS Take by mouth 2 times daily      Prenatal Vit-Fe Fumarate-FA (PRENATAL VITAMIN PO) Take by mouth       No facility-administered encounter medications on file as of 3/2/2023.                Labor signs, pregnancy warning signs, and fetal movement counting reviewed (if applicable)        MAYNOR Bhatti - CNP 03/02/23 3:06 PM

## 2023-03-02 NOTE — ASSESSMENT & PLAN NOTE
PTL/labor precautions, [address], and pregnancy warning signs reviewed. Pt advised to call the office at [phone] or go straight to Labor and Delivery at [facility] with any of the following concerns vaginal bleeding, leaking of fluid, dusty regularly Q 5-7 minutes for over an hour or not feeling the baby move.    RTO 2 weeks OBV, US, GBS, CBC

## 2023-03-17 ENCOUNTER — ROUTINE PRENATAL (OUTPATIENT)
Dept: OBGYN CLINIC | Age: 33
End: 2023-03-17

## 2023-03-17 VITALS
HEIGHT: 64 IN | DIASTOLIC BLOOD PRESSURE: 66 MMHG | WEIGHT: 161 LBS | SYSTOLIC BLOOD PRESSURE: 126 MMHG | BODY MASS INDEX: 27.49 KG/M2

## 2023-03-17 DIAGNOSIS — O36.63X0 EXCESSIVE FETAL GROWTH AFFECTING MANAGEMENT OF PREGNANCY IN THIRD TRIMESTER, SINGLE OR UNSPECIFIED FETUS: Primary | ICD-10-CM

## 2023-03-17 DIAGNOSIS — Z34.83 MULTIGRAVIDA IN THIRD TRIMESTER: ICD-10-CM

## 2023-03-17 DIAGNOSIS — O99.013 ANEMIA AFFECTING PREGNANCY IN THIRD TRIMESTER: ICD-10-CM

## 2023-03-17 LAB
ERYTHROCYTE [DISTWIDTH] IN BLOOD BY AUTOMATED COUNT: 13.3 % (ref 11.9–14.6)
HCT VFR BLD AUTO: 34.6 % (ref 35.8–46.3)
HGB BLD-MCNC: 11.4 G/DL (ref 11.7–15.4)
MCH RBC QN AUTO: 29.8 PG (ref 26.1–32.9)
MCHC RBC AUTO-ENTMCNC: 32.9 G/DL (ref 31.4–35)
MCV RBC AUTO: 90.6 FL (ref 82–102)
NRBC # BLD: 0 K/UL (ref 0–0.2)
PLATELET # BLD AUTO: 239 K/UL (ref 150–450)
PMV BLD AUTO: 11.8 FL (ref 9.4–12.3)
RBC # BLD AUTO: 3.82 M/UL (ref 4.05–5.2)
WBC # BLD AUTO: 13.7 K/UL (ref 4.3–11.1)

## 2023-03-17 ASSESSMENT — PATIENT HEALTH QUESTIONNAIRE - PHQ9
SUM OF ALL RESPONSES TO PHQ QUESTIONS 1-9: 0
SUM OF ALL RESPONSES TO PHQ9 QUESTIONS 1 & 2: 0
SUM OF ALL RESPONSES TO PHQ QUESTIONS 1-9: 0
2. FEELING DOWN, DEPRESSED OR HOPELESS: 0
SUM OF ALL RESPONSES TO PHQ QUESTIONS 1-9: 0
SUM OF ALL RESPONSES TO PHQ QUESTIONS 1-9: 0
1. LITTLE INTEREST OR PLEASURE IN DOING THINGS: 0

## 2023-03-17 NOTE — PROGRESS NOTES
This is a 29 y.o.   at 36w3d for routine OB visit. Her Estimated Due Date is 2023, by Last Menstrual Period    Denies leaking of fluid, vaginal bleeding, or regular contractions. Reports fetal movement. Current Outpatient Medications on File Prior to Visit   Medication Sig Dispense Refill    Iron, Ferrous Sulfate, 325 (65 Fe) MG TABS Take by mouth 2 times daily      Prenatal Vit-Fe Fumarate-FA (PRENATAL VITAMIN PO) Take by mouth       No current facility-administered medications on file prior to visit.        Allergies   Allergen Reactions    Penicillins Rash       OB History    Para Term  AB Living   3 1 1 0 1 1   SAB IAB Ectopic Molar Multiple Live Births   1 0 0 0 0 1       # 1 - Date: , Sex: None, Weight: None, GA: None, Delivery: None, Apgar1: None, Apgar5: None, Living: None, Birth Comments: None    # 2 - Date: 01/10/21, Sex: Male, Weight: 7 lb 4.9 oz (3.313 kg), GA: 38w5d, Delivery: Vaginal, Spontaneous, Apgar1: 9, Apgar5: 9, Living: Living, Birth Comments: None    # 3 - Date: None, Sex: None, Weight: None, GA: None, Delivery: None, Apgar1: None, Apgar5: None, Living: None, Birth Comments: None        Past Medical History:   Diagnosis Date    Abnormal Pap smear of cervix     Anemia     Stress incontinence     With sneezing       Past Surgical History:   Procedure Laterality Date    COLPOSCOPY      Have had 3 or so since the first       Family History   Problem Relation Age of Onset    Heart Surgery Paternal Grandfather     Stroke Maternal Grandmother     Breast Cancer Neg Hx     Colon Cancer Neg Hx     Ovarian Cancer Neg Hx     Uterine Cancer Neg Hx        Social History     Socioeconomic History    Marital status:      Spouse name: Not on file    Number of children: Not on file    Years of education: Not on file    Highest education level: Not on file   Occupational History    Not on file   Tobacco Use    Smoking status: Never    Smokeless tobacco: Never   Substance and Sexual Activity    Alcohol use: Not Currently     Alcohol/week: 7.0 standard drinks     Types: 2 Glasses of wine, 5 Cans of beer per week    Drug use: Never    Sexual activity: Yes     Partners: Male   Other Topics Concern    Not on file   Social History Narrative    Abuse: Feels safe at home, no history of physical abuse, no history of sexual abuse      Social Determinants of Health     Financial Resource Strain: Low Risk     Difficulty of Paying Living Expenses: Not hard at all   Food Insecurity: No Food Insecurity    Worried About Running Out of Food in the Last Year: Never true    Caleb of Food in the Last Year: Never true   Transportation Needs: Unknown    Lack of Transportation (Medical): Not on file    Lack of Transportation (Non-Medical): No   Physical Activity: Not on file   Stress: Not on file   Social Connections: Not on file   Intimate Partner Violence: Not on file   Housing Stability: Unknown    Unable to Pay for Housing in the Last Year: Not on file    Number of Places Lived in the Last Year: Not on file    Unstable Housing in the Last Year: No           Objective    Vitals:    03/17/23 0815   BP: 126/66   Site: Right Upper Arm   Position: Sitting   Weight: 161 lb (73 kg)   Height: 5' 4\" (1.626 m)       General: well developed, well nourished, in no acute distress    Head: normocephalic and atraumatic    Resp: even and unlabored    Psych: Normal mood and affect        Assessment and Plan      Patient Active Problem List    Diagnosis Date Noted    Anemia affecting pregnancy in third trimester 01/18/2023     Priority: Medium     Overview Note:     Add'l iron       Assessment & Plan Note:            Excessive fetal growth affecting management of mother in third trimester, antepartum 01/17/2023     Priority: Medium     Overview Note:     1/17/2023: EFW 87%, AC 89%, MICHELLE nl, vertex, limit carbs.  glucola today  2/16/2023: EFW 55%, AC 50%, MICHELLE nl, vertex  3/17/2023: EFW 59%, AC 57%, MICHELLE nl, vertex      History of abnormal cervical Pap smear 09/13/2022     Priority: Medium     Overview Note:     2015 LGSIL, Colpo performed with no biopsies  2017 ASCUS HPV +, Colpo CIN1  2018 Pap negative  02/22/2021 Pap negative      Multigravida in third trimester 09/01/2022     Overview Note:     EDC by LMP confirmed by 8 4/7 week US    10/03/2022: NIPT neg x3, male, CF/SMA/DMD/Fragile X  Negative  11/28/22: DAVID US wnl, AC measuring 99%. Recheck growth at 28 weeks  1/17/2023: EFW 87%, AC 89%, MICHELLE nl, vertex, limit carbs. glucola today  3/17/2023: EFW 59%, AC 57%, MICHELLE nl, vertex    1/31/2023: tdap given today       Assessment & Plan Note:     PTL/labor precautions, [address] [place], and pregnancy warning signs reviewed. Pt advised to call the office at [phone] or go straight to Labor and Delivery at [facility] with any of the following concerns vaginal bleeding, leaking of fluid, dusty regularly Q 5-7 minutes for over an hour or not feeling the baby move. RTO 1 wk  GBS/CBC today          Problem List Items Addressed This Visit        Other    Anemia affecting pregnancy in third trimester               Relevant Orders    AMB POC US OB RE-EVAL/FOLLOW UP (Completed)    Excessive fetal growth affecting management of mother in third trimester, antepartum - Primary    Relevant Orders    AMB POC US OB RE-EVAL/FOLLOW UP (Completed)    Multigravida in third trimester     PTL/labor precautions, [address] [place], and pregnancy warning signs reviewed. Pt advised to call the office at [phone] or go straight to Labor and Delivery at [facility] with any of the following concerns vaginal bleeding, leaking of fluid, dusty regularly Q 5-7 minutes for over an hour or not feeling the baby move.    RTO 1 wk  GBS/CBC today          Relevant Orders    AMB POC US OB RE-EVAL/FOLLOW UP (Completed)    CBC    Culture, GBS with Penicillin Allergy       Orders Placed This Encounter   Procedures   • Culture, GBS with Penicillin Allergy   • AMB POC US OB RE-EVAL/FOLLOW UP   • CBC       Outpatient Encounter Medications as of 3/17/2023   Medication Sig Dispense Refill   • Iron, Ferrous Sulfate, 325 (65 Fe) MG TABS Take by mouth 2 times daily     • Prenatal Vit-Fe Fumarate-FA (PRENATAL VITAMIN PO) Take by mouth       No facility-administered encounter medications on file as of 3/17/2023.               Labor signs, pregnancy warning signs, and fetal movement counting reviewed (if applicable)        MAYNOR Casillas - CNP 03/17/23 8:25 AM

## 2023-03-17 NOTE — ASSESSMENT & PLAN NOTE
PTL/labor precautions, [address], and pregnancy warning signs reviewed. Pt advised to call the office at [phone] or go straight to Labor and Delivery at [facility] with any of the following concerns vaginal bleeding, leaking of fluid, dusty regularly Q 5-7 minutes for over an hour or not feeling the baby move.    RTO 1 wk  GBS/CBC today

## 2023-03-17 NOTE — PROGRESS NOTES
Patient comes in today for routine prenatal visit. No complaints/concerns today.      Fetal Movement: Yes  Contractions: No  Vaginal Bleeding: No  Leaking Fluid: No  GI/: No    Vitals:    03/17/23 0815   BP: 126/66   Site: Right Upper Arm   Position: Sitting   Weight: 161 lb (73 kg)   Height: 5' 4\" (1.626 m)

## 2023-03-19 LAB
BACTERIA SPEC CULT: NORMAL
SERVICE CMNT-IMP: NORMAL

## 2023-03-19 NOTE — PROGRESS NOTES
I have reviewed the patient's visit today including history, exam and assessment by SABI Hoyt. I agree with treatment/plan as above.     Renetta Krishnan MD  4:05 PM  03/19/23

## 2023-03-21 LAB
BACTERIA SPEC CULT: NORMAL
SERVICE CMNT-IMP: NORMAL

## 2023-03-24 ENCOUNTER — ROUTINE PRENATAL (OUTPATIENT)
Dept: OBGYN CLINIC | Age: 33
End: 2023-03-24

## 2023-03-24 VITALS
HEIGHT: 64 IN | DIASTOLIC BLOOD PRESSURE: 66 MMHG | BODY MASS INDEX: 27.31 KG/M2 | SYSTOLIC BLOOD PRESSURE: 114 MMHG | WEIGHT: 160 LBS

## 2023-03-24 DIAGNOSIS — Z34.83 MULTIGRAVIDA IN THIRD TRIMESTER: Primary | ICD-10-CM

## 2023-03-24 DIAGNOSIS — O99.013 ANEMIA AFFECTING PREGNANCY IN THIRD TRIMESTER: ICD-10-CM

## 2023-03-24 DIAGNOSIS — O36.63X0 EXCESSIVE FETAL GROWTH AFFECTING MANAGEMENT OF PREGNANCY IN THIRD TRIMESTER, SINGLE OR UNSPECIFIED FETUS: ICD-10-CM

## 2023-03-24 ASSESSMENT — PATIENT HEALTH QUESTIONNAIRE - PHQ9
2. FEELING DOWN, DEPRESSED OR HOPELESS: 0
SUM OF ALL RESPONSES TO PHQ QUESTIONS 1-9: 0
SUM OF ALL RESPONSES TO PHQ9 QUESTIONS 1 & 2: 0
SUM OF ALL RESPONSES TO PHQ QUESTIONS 1-9: 0
SUM OF ALL RESPONSES TO PHQ QUESTIONS 1-9: 0
1. LITTLE INTEREST OR PLEASURE IN DOING THINGS: 0
SUM OF ALL RESPONSES TO PHQ QUESTIONS 1-9: 0

## 2023-03-24 NOTE — ASSESSMENT & PLAN NOTE
PTL/labor precautions, [facility], and pregnancy warning signs reviewed. Pt advised to call the office at [phone] or go straight to Labor and Delivery at [address] with any of the following concerns vaginal bleeding, leaking of fluid, dusty regularly Q 5-7 minutes for over an hour or not feeling the baby move.    RTO 1 wk OBV

## 2023-03-24 NOTE — PROGRESS NOTES
Patient comes in today for routine prenatal visit. No complaints/concerns today.      Fetal Movement: Yes  Contractions: No  Vaginal Bleeding: No  Leaking Fluid: No  GI/: No    Vitals:    03/24/23 0911   BP: 114/66   Site: Left Upper Arm   Position: Sitting   Weight: 160 lb (72.6 kg)   Height: 5' 4\" (1.626 m)
Smokeless tobacco: Never   Substance and Sexual Activity    Alcohol use: Not Currently     Alcohol/week: 7.0 standard drinks     Types: 2 Glasses of wine, 5 Cans of beer per week    Drug use: Never    Sexual activity: Yes     Partners: Male   Other Topics Concern    Not on file   Social History Narrative    Abuse: Feels safe at home, no history of physical abuse, no history of sexual abuse      Social Determinants of Health     Financial Resource Strain: Low Risk     Difficulty of Paying Living Expenses: Not hard at all   Food Insecurity: No Food Insecurity    Worried About Running Out of Food in the Last Year: Never true    Caleb of Food in the Last Year: Never true   Transportation Needs: Unknown    Lack of Transportation (Medical): Not on file    Lack of Transportation (Non-Medical): No   Physical Activity: Not on file   Stress: Not on file   Social Connections: Not on file   Intimate Partner Violence: Not on file   Housing Stability: Unknown    Unable to Pay for Housing in the Last Year: Not on file    Number of Places Lived in the Last Year: Not on file    Unstable Housing in the Last Year: No           Objective    Vitals:    03/24/23 0911   BP: 114/66   Site: Left Upper Arm   Position: Sitting   Weight: 160 lb (72.6 kg)   Height: 5' 4\" (1.626 m)       General: well developed, well nourished, in no acute distress    Head: normocephalic and atraumatic    Resp: even and unlabored    Psych: Normal mood and affect        Assessment and Plan      Patient Active Problem List    Diagnosis Date Noted    Anemia affecting pregnancy in third trimester 01/18/2023     Priority: Medium     Overview Note:     Add'l iron    3/17/2023: Hgb 11.4       Assessment & Plan Note:     noted      Excessive fetal growth affecting management of mother in third trimester, antepartum 01/17/2023     Priority: Medium     Overview Note:     1/17/2023: EFW 87%, AC 89%, MICHELLE nl, vertex, limit carbs.  glucola today  2/16/2023: EFW

## 2023-03-30 ENCOUNTER — ROUTINE PRENATAL (OUTPATIENT)
Dept: OBGYN CLINIC | Age: 33
End: 2023-03-30

## 2023-03-30 VITALS
SYSTOLIC BLOOD PRESSURE: 116 MMHG | DIASTOLIC BLOOD PRESSURE: 70 MMHG | HEIGHT: 64 IN | BODY MASS INDEX: 27.83 KG/M2 | WEIGHT: 163 LBS

## 2023-03-30 DIAGNOSIS — O36.63X0 EXCESSIVE FETAL GROWTH AFFECTING MANAGEMENT OF PREGNANCY IN THIRD TRIMESTER, SINGLE OR UNSPECIFIED FETUS: ICD-10-CM

## 2023-03-30 DIAGNOSIS — Z34.83 MULTIGRAVIDA IN THIRD TRIMESTER: Primary | ICD-10-CM

## 2023-03-30 DIAGNOSIS — O99.013 ANEMIA AFFECTING PREGNANCY IN THIRD TRIMESTER: ICD-10-CM

## 2023-03-30 PROCEDURE — 99902 PR PRENATAL VISIT: CPT | Performed by: NURSE PRACTITIONER

## 2023-03-30 ASSESSMENT — PATIENT HEALTH QUESTIONNAIRE - PHQ9
SUM OF ALL RESPONSES TO PHQ QUESTIONS 1-9: 0
SUM OF ALL RESPONSES TO PHQ9 QUESTIONS 1 & 2: 0
SUM OF ALL RESPONSES TO PHQ QUESTIONS 1-9: 0
SUM OF ALL RESPONSES TO PHQ QUESTIONS 1-9: 0
2. FEELING DOWN, DEPRESSED OR HOPELESS: 0
1. LITTLE INTEREST OR PLEASURE IN DOING THINGS: 0
SUM OF ALL RESPONSES TO PHQ QUESTIONS 1-9: 0

## 2023-03-30 NOTE — ASSESSMENT & PLAN NOTE
PTL/labor precautions, [address], and pregnancy warning signs reviewed. Pt advised to call the office at [phone] or go straight to Labor and Delivery at [facility] with any of the following concerns vaginal bleeding, leaking of fluid, dusty regularly Q 5-7 minutes for over an hour or not feeling the baby move.    RTO 1 wk OBV

## 2023-03-30 NOTE — PROGRESS NOTES
I have reviewed the patient's visit today including history, exam and assessment by SABI Chisholm. I agree with treatment/plan as above.     Laurel Cash MD  8:40 AM  03/30/23
Patient comes in today for routine prenatal visit. No complaints/concerns today.      Fetal Movement: Yes  Contractions: No  Vaginal Bleeding: No  Leaking Fluid: No  GI/: No    Vitals:    03/30/23 0822   BP: 116/70   Site: Left Upper Arm   Position: Sitting   Weight: 163 lb (73.9 kg)   Height: 5' 4\" (1.626 m)
 Prenatal Vit-Fe Fumarate-FA (PRENATAL VITAMIN PO) Take by mouth       No facility-administered encounter medications on file as of 3/30/2023.                Labor signs, pregnancy warning signs, and fetal movement counting reviewed (if applicable)        Valetta Schaumann, APRN - CNP 03/30/23 8:35 AM

## 2023-04-07 ENCOUNTER — ROUTINE PRENATAL (OUTPATIENT)
Dept: OBGYN CLINIC | Age: 33
End: 2023-04-07

## 2023-04-07 VITALS
DIASTOLIC BLOOD PRESSURE: 74 MMHG | SYSTOLIC BLOOD PRESSURE: 118 MMHG | HEIGHT: 64 IN | WEIGHT: 164 LBS | BODY MASS INDEX: 28 KG/M2

## 2023-04-07 DIAGNOSIS — Z87.42 HISTORY OF ABNORMAL CERVICAL PAP SMEAR: ICD-10-CM

## 2023-04-07 DIAGNOSIS — Z34.83 MULTIGRAVIDA IN THIRD TRIMESTER: Primary | ICD-10-CM

## 2023-04-07 DIAGNOSIS — O36.63X0 EXCESSIVE FETAL GROWTH AFFECTING MANAGEMENT OF PREGNANCY IN THIRD TRIMESTER, SINGLE OR UNSPECIFIED FETUS: ICD-10-CM

## 2023-04-07 DIAGNOSIS — O99.013 ANEMIA AFFECTING PREGNANCY IN THIRD TRIMESTER: ICD-10-CM

## 2023-04-07 ASSESSMENT — PATIENT HEALTH QUESTIONNAIRE - PHQ9
1. LITTLE INTEREST OR PLEASURE IN DOING THINGS: 0
SUM OF ALL RESPONSES TO PHQ QUESTIONS 1-9: 0
SUM OF ALL RESPONSES TO PHQ9 QUESTIONS 1 & 2: 0
2. FEELING DOWN, DEPRESSED OR HOPELESS: 0
SUM OF ALL RESPONSES TO PHQ QUESTIONS 1-9: 0

## 2023-04-07 NOTE — PROGRESS NOTES
Patient comes in today for routine prenatal visit. No complaints/concerns today.      Fetal Movement: Yes  Contractions: No  Vaginal Bleeding: No  Leaking Fluid: No  GI/: No    Vitals:    04/07/23 0839   BP: 118/74   Site: Left Upper Arm   Position: Sitting   Weight: 164 lb (74.4 kg)   Height: 5' 4\" (1.626 m)
Also D/W that with elective/prophylactic inductions, having her induction postponed for medically indicated inductions is always a possibility. Pt understands, accepts all known and unknown risks and wishes to proceed.  Anemia affecting pregnancy in third trimester 2023     Overview Note:     Add'l iron    3/17/2023: Hgb 11.4       Assessment & Plan Note:     noted      History of abnormal cervical Pap smear 2022     Overview Note:      LGSIL, Colpo performed with no biopsies   ASCUS HPV +, Colpo CIN1  2018 Pap negative  2021 Pap negative       Assessment & Plan Note:     noted        Problem List Items Addressed This Visit        Other    Multigravida in third trimester - Primary     Educated patient of signs and symptoms of labor including but not limited to regular uterine contractions every 5-7 minutes for 1 hour, vaginal bleeding or leakage of fluid to seek immediate care. D/W pt at length induction vs spontaneous labor risks and benefits including but not limited to: favorable cervix, Mcrae's score, elective/prophylactic vs medical induction, possible increased risk of longer latent stage, possible increased risk of FHT's abnormalities, tachysystole, possible increased risk of , placental abruption, uterine rupture, varying methods of cervical ripening and induction (cytotec, cervical balloon, cervidil and pitocin)  Also D/W that with elective/prophylactic inductions, having her induction postponed for medically indicated inductions is always a possibility. Pt understands, accepts all known and unknown risks and wishes to proceed.           Excessive fetal growth affecting management of mother in third trimester, antepartum     noted         History of abnormal cervical Pap smear     noted         Anemia affecting pregnancy in third trimester     noted             Isabel Saravia MD     9:03 AM    23

## 2023-04-07 NOTE — ASSESSMENT & PLAN NOTE
Educated patient of signs and symptoms of labor including but not limited to regular uterine contractions every 5-7 minutes for 1 hour, vaginal bleeding or leakage of fluid to seek immediate care. D/W pt at length induction vs spontaneous labor risks and benefits including but not limited to: favorable cervix, Mcrae's score, elective/prophylactic vs medical induction, possible increased risk of longer latent stage, possible increased risk of FHT's abnormalities, tachysystole, possible increased risk of , placental abruption, uterine rupture, varying methods of cervical ripening and induction (cytotec, cervical balloon, cervidil and pitocin)  Also D/W that with elective/prophylactic inductions, having her induction postponed for medically indicated inductions is always a possibility. Pt understands, accepts all known and unknown risks and wishes to proceed.

## 2023-04-07 NOTE — PATIENT INSTRUCTIONS
Please call Labor and Delivery ([phone]) Wednesday morning at 5:00 am and they will tell you when to be there. I will see you later that morning! If you develop signs and symptoms of labor including but not limited to regular uterine contractions every 5-7 minutes for 1 hour, vaginal bleeding or leakage of fluid please contact our office and/or seek immediate care. Thanks for coming to see us today and letting us take care of you!

## 2023-04-11 ENCOUNTER — PREP FOR PROCEDURE (OUTPATIENT)
Dept: OBGYN CLINIC | Age: 33
End: 2023-04-11

## 2023-04-11 ENCOUNTER — HOSPITAL ENCOUNTER (INPATIENT)
Age: 33
LOS: 2 days | Discharge: HOME | End: 2023-04-13
Attending: OBSTETRICS & GYNECOLOGY | Admitting: OBSTETRICS & GYNECOLOGY
Payer: COMMERCIAL

## 2023-04-11 LAB
BASOPHILS # BLD: 0 K/UL (ref 0–0.2)
BASOPHILS NFR BLD: 0 % (ref 0–2)
DIFFERENTIAL METHOD BLD: ABNORMAL
EOSINOPHIL # BLD: 0.3 K/UL (ref 0–0.8)
EOSINOPHIL NFR BLD: 2 % (ref 0.5–7.8)
ERYTHROCYTE [DISTWIDTH] IN BLOOD BY AUTOMATED COUNT: 13.3 % (ref 11.9–14.6)
HCT VFR BLD AUTO: 35.3 % (ref 35.8–46.3)
HGB BLD-MCNC: 11.7 G/DL (ref 11.7–15.4)
IMM GRANULOCYTES # BLD AUTO: 0.1 K/UL (ref 0–0.5)
IMM GRANULOCYTES NFR BLD AUTO: 1 % (ref 0–5)
LYMPHOCYTES # BLD: 1.8 K/UL (ref 0.5–4.6)
LYMPHOCYTES NFR BLD: 13 % (ref 13–44)
MCH RBC QN AUTO: 28.6 PG (ref 26.1–32.9)
MCHC RBC AUTO-ENTMCNC: 33.1 G/DL (ref 31.4–35)
MCV RBC AUTO: 86.3 FL (ref 82–102)
MONOCYTES # BLD: 0.9 K/UL (ref 0.1–1.3)
MONOCYTES NFR BLD: 6 % (ref 4–12)
NEUTS SEG # BLD: 11.2 K/UL (ref 1.7–8.2)
NEUTS SEG NFR BLD: 78 % (ref 43–78)
NRBC # BLD: 0 K/UL (ref 0–0.2)
PLATELET # BLD AUTO: 231 K/UL (ref 150–450)
PMV BLD AUTO: 11.2 FL (ref 9.4–12.3)
RBC # BLD AUTO: 4.09 M/UL (ref 4.05–5.2)
WBC # BLD AUTO: 14.4 K/UL (ref 4.3–11.1)

## 2023-04-11 PROCEDURE — 2580000003 HC RX 258: Performed by: OBSTETRICS & GYNECOLOGY

## 2023-04-11 PROCEDURE — 36415 COLL VENOUS BLD VENIPUNCTURE: CPT

## 2023-04-11 PROCEDURE — 85025 COMPLETE CBC W/AUTO DIFF WBC: CPT

## 2023-04-11 PROCEDURE — 86900 BLOOD TYPING SEROLOGIC ABO: CPT

## 2023-04-11 PROCEDURE — 99284 EMERGENCY DEPT VISIT MOD MDM: CPT

## 2023-04-11 PROCEDURE — 1100000000 HC RM PRIVATE

## 2023-04-11 RX ORDER — DEXTROSE, SODIUM CHLORIDE, SODIUM LACTATE, POTASSIUM CHLORIDE, AND CALCIUM CHLORIDE 5; .6; .31; .03; .02 G/100ML; G/100ML; G/100ML; G/100ML; G/100ML
INJECTION, SOLUTION INTRAVENOUS CONTINUOUS
Status: DISCONTINUED | OUTPATIENT
Start: 2023-04-11 | End: 2023-04-12

## 2023-04-11 RX ORDER — SODIUM CHLORIDE, SODIUM LACTATE, POTASSIUM CHLORIDE, AND CALCIUM CHLORIDE .6; .31; .03; .02 G/100ML; G/100ML; G/100ML; G/100ML
500 INJECTION, SOLUTION INTRAVENOUS PRN
Status: CANCELLED | OUTPATIENT
Start: 2023-04-11

## 2023-04-11 RX ORDER — SODIUM CHLORIDE 9 MG/ML
25 INJECTION, SOLUTION INTRAVENOUS PRN
Status: CANCELLED | OUTPATIENT
Start: 2023-04-11

## 2023-04-11 RX ORDER — SODIUM CHLORIDE 0.9 % (FLUSH) 0.9 %
5-40 SYRINGE (ML) INJECTION PRN
Status: CANCELLED | OUTPATIENT
Start: 2023-04-11

## 2023-04-11 RX ORDER — BUTORPHANOL TARTRATE 1 MG/ML
1 INJECTION, SOLUTION INTRAMUSCULAR; INTRAVENOUS
Status: CANCELLED | OUTPATIENT
Start: 2023-04-11

## 2023-04-11 RX ORDER — METHYLERGONOVINE MALEATE 0.2 MG/ML
200 INJECTION INTRAVENOUS PRN
Status: CANCELLED | OUTPATIENT
Start: 2023-04-11

## 2023-04-11 RX ORDER — SODIUM CHLORIDE 9 MG/ML
25 INJECTION, SOLUTION INTRAVENOUS PRN
Status: DISCONTINUED | OUTPATIENT
Start: 2023-04-11 | End: 2023-04-12

## 2023-04-11 RX ORDER — SODIUM CHLORIDE 0.9 % (FLUSH) 0.9 %
5-40 SYRINGE (ML) INJECTION EVERY 12 HOURS SCHEDULED
Status: DISCONTINUED | OUTPATIENT
Start: 2023-04-11 | End: 2023-04-12

## 2023-04-11 RX ORDER — ONDANSETRON 2 MG/ML
4 INJECTION INTRAMUSCULAR; INTRAVENOUS EVERY 6 HOURS PRN
Status: CANCELLED | OUTPATIENT
Start: 2023-04-11

## 2023-04-11 RX ORDER — TERBUTALINE SULFATE 1 MG/ML
0.25 INJECTION, SOLUTION SUBCUTANEOUS ONCE
Status: CANCELLED | OUTPATIENT
Start: 2023-04-11 | End: 2023-04-11

## 2023-04-11 RX ORDER — DEXTROSE, SODIUM CHLORIDE, SODIUM LACTATE, POTASSIUM CHLORIDE, AND CALCIUM CHLORIDE 5; .6; .31; .03; .02 G/100ML; G/100ML; G/100ML; G/100ML; G/100ML
INJECTION, SOLUTION INTRAVENOUS CONTINUOUS
Status: CANCELLED | OUTPATIENT
Start: 2023-04-11

## 2023-04-11 RX ORDER — SODIUM CHLORIDE 0.9 % (FLUSH) 0.9 %
5-40 SYRINGE (ML) INJECTION EVERY 12 HOURS SCHEDULED
Status: CANCELLED | OUTPATIENT
Start: 2023-04-11

## 2023-04-11 RX ORDER — ONDANSETRON 2 MG/ML
4 INJECTION INTRAMUSCULAR; INTRAVENOUS EVERY 6 HOURS PRN
Status: DISCONTINUED | OUTPATIENT
Start: 2023-04-11 | End: 2023-04-12

## 2023-04-11 RX ORDER — MISOPROSTOL 100 UG/1
800 TABLET ORAL PRN
Status: CANCELLED | OUTPATIENT
Start: 2023-04-11

## 2023-04-11 RX ORDER — SODIUM CHLORIDE, SODIUM LACTATE, POTASSIUM CHLORIDE, AND CALCIUM CHLORIDE .6; .31; .03; .02 G/100ML; G/100ML; G/100ML; G/100ML
1000 INJECTION, SOLUTION INTRAVENOUS PRN
Status: DISCONTINUED | OUTPATIENT
Start: 2023-04-11 | End: 2023-04-12

## 2023-04-11 RX ORDER — SODIUM CHLORIDE, SODIUM LACTATE, POTASSIUM CHLORIDE, AND CALCIUM CHLORIDE .6; .31; .03; .02 G/100ML; G/100ML; G/100ML; G/100ML
1000 INJECTION, SOLUTION INTRAVENOUS PRN
Status: CANCELLED | OUTPATIENT
Start: 2023-04-11

## 2023-04-11 RX ORDER — SODIUM CHLORIDE, SODIUM LACTATE, POTASSIUM CHLORIDE, AND CALCIUM CHLORIDE .6; .31; .03; .02 G/100ML; G/100ML; G/100ML; G/100ML
500 INJECTION, SOLUTION INTRAVENOUS PRN
Status: DISCONTINUED | OUTPATIENT
Start: 2023-04-11 | End: 2023-04-12

## 2023-04-11 RX ORDER — SODIUM CHLORIDE 0.9 % (FLUSH) 0.9 %
5-40 SYRINGE (ML) INJECTION PRN
Status: DISCONTINUED | OUTPATIENT
Start: 2023-04-11 | End: 2023-04-12

## 2023-04-11 RX ADMIN — SODIUM CHLORIDE, POTASSIUM CHLORIDE, SODIUM LACTATE AND CALCIUM CHLORIDE 1000 ML: 600; 310; 30; 20 INJECTION, SOLUTION INTRAVENOUS at 23:17

## 2023-04-12 LAB
ABO + RH BLD: NORMAL
BLOOD GROUP ANTIBODIES SERPL: NORMAL
SPECIMEN EXP DATE BLD: NORMAL

## 2023-04-12 PROCEDURE — 6360000002 HC RX W HCPCS: Performed by: OBSTETRICS & GYNECOLOGY

## 2023-04-12 PROCEDURE — 2500000003 HC RX 250 WO HCPCS: Performed by: OBSTETRICS & GYNECOLOGY

## 2023-04-12 PROCEDURE — 00HU33Z INSERTION OF INFUSION DEVICE INTO SPINAL CANAL, PERCUTANEOUS APPROACH: ICD-10-PCS | Performed by: OBSTETRICS & GYNECOLOGY

## 2023-04-12 PROCEDURE — 7100000011 HC PHASE II RECOVERY - ADDTL 15 MIN

## 2023-04-12 PROCEDURE — 7100000010 HC PHASE II RECOVERY - FIRST 15 MIN

## 2023-04-12 PROCEDURE — 6370000000 HC RX 637 (ALT 250 FOR IP): Performed by: OBSTETRICS & GYNECOLOGY

## 2023-04-12 PROCEDURE — 0HQ9XZZ REPAIR PERINEUM SKIN, EXTERNAL APPROACH: ICD-10-PCS | Performed by: OBSTETRICS & GYNECOLOGY

## 2023-04-12 PROCEDURE — 59400 OBSTETRICAL CARE: CPT | Performed by: OBSTETRICS & GYNECOLOGY

## 2023-04-12 PROCEDURE — 7210000100 HC LABOR FEE PER 1 HR

## 2023-04-12 PROCEDURE — 1100000000 HC RM PRIVATE

## 2023-04-12 PROCEDURE — 7220000101 HC DELIVERY VAGINAL/SINGLE

## 2023-04-12 PROCEDURE — 2580000003 HC RX 258: Performed by: OBSTETRICS & GYNECOLOGY

## 2023-04-12 PROCEDURE — 6360000002 HC RX W HCPCS

## 2023-04-12 PROCEDURE — 4A1HX4Z MONITORING OF PRODUCTS OF CONCEPTION, CARDIAC ELECTRICAL ACTIVITY, EXTERNAL APPROACH: ICD-10-PCS | Performed by: OBSTETRICS & GYNECOLOGY

## 2023-04-12 RX ORDER — ONDANSETRON 8 MG/1
8 TABLET, ORALLY DISINTEGRATING ORAL EVERY 8 HOURS PRN
Status: DISCONTINUED | OUTPATIENT
Start: 2023-04-12 | End: 2023-04-13 | Stop reason: HOSPADM

## 2023-04-12 RX ORDER — ACETAMINOPHEN 500 MG
1000 TABLET ORAL EVERY 6 HOURS PRN
Status: DISCONTINUED | OUTPATIENT
Start: 2023-04-12 | End: 2023-04-13 | Stop reason: HOSPADM

## 2023-04-12 RX ORDER — TRANEXAMIC ACID 10 MG/ML
1000 INJECTION, SOLUTION INTRAVENOUS
Status: COMPLETED | OUTPATIENT
Start: 2023-04-12 | End: 2023-04-12

## 2023-04-12 RX ORDER — DOCUSATE SODIUM 100 MG/1
100 CAPSULE, LIQUID FILLED ORAL 2 TIMES DAILY
Status: DISCONTINUED | OUTPATIENT
Start: 2023-04-12 | End: 2023-04-13 | Stop reason: HOSPADM

## 2023-04-12 RX ORDER — NALOXONE HYDROCHLORIDE 0.4 MG/ML
INJECTION, SOLUTION INTRAMUSCULAR; INTRAVENOUS; SUBCUTANEOUS PRN
Status: DISCONTINUED | OUTPATIENT
Start: 2023-04-12 | End: 2023-04-13 | Stop reason: HOSPADM

## 2023-04-12 RX ORDER — POLYETHYLENE GLYCOL 3350 17 G/17G
17 POWDER, FOR SOLUTION ORAL DAILY
Status: DISCONTINUED | OUTPATIENT
Start: 2023-04-12 | End: 2023-04-13 | Stop reason: HOSPADM

## 2023-04-12 RX ORDER — METHYLERGONOVINE MALEATE 0.2 MG/ML
200 INJECTION INTRAVENOUS PRN
Status: DISCONTINUED | OUTPATIENT
Start: 2023-04-12 | End: 2023-04-12

## 2023-04-12 RX ORDER — SODIUM CHLORIDE 0.9 % (FLUSH) 0.9 %
5-40 SYRINGE (ML) INJECTION EVERY 12 HOURS SCHEDULED
Status: DISCONTINUED | OUTPATIENT
Start: 2023-04-12 | End: 2023-04-13 | Stop reason: HOSPADM

## 2023-04-12 RX ORDER — TRANEXAMIC ACID 10 MG/ML
1000 INJECTION, SOLUTION INTRAVENOUS
Status: DISCONTINUED | OUTPATIENT
Start: 2023-04-12 | End: 2023-04-12

## 2023-04-12 RX ORDER — SODIUM CHLORIDE 9 MG/ML
INJECTION, SOLUTION INTRAVENOUS PRN
Status: DISCONTINUED | OUTPATIENT
Start: 2023-04-12 | End: 2023-04-13 | Stop reason: HOSPADM

## 2023-04-12 RX ORDER — CLINDAMYCIN PHOSPHATE 900 MG/50ML
900 INJECTION INTRAVENOUS EVERY 8 HOURS
Status: DISCONTINUED | OUTPATIENT
Start: 2023-04-12 | End: 2023-04-12

## 2023-04-12 RX ORDER — OXYCODONE HYDROCHLORIDE 5 MG/1
5 TABLET ORAL EVERY 4 HOURS PRN
Status: DISCONTINUED | OUTPATIENT
Start: 2023-04-12 | End: 2023-04-13 | Stop reason: HOSPADM

## 2023-04-12 RX ORDER — OXYCODONE HYDROCHLORIDE 5 MG/1
10 TABLET ORAL EVERY 4 HOURS PRN
Status: DISCONTINUED | OUTPATIENT
Start: 2023-04-12 | End: 2023-04-13 | Stop reason: HOSPADM

## 2023-04-12 RX ORDER — LANOLIN
CREAM (ML) TOPICAL PRN
Status: DISCONTINUED | OUTPATIENT
Start: 2023-04-12 | End: 2023-04-13 | Stop reason: HOSPADM

## 2023-04-12 RX ORDER — SODIUM CHLORIDE 0.9 % (FLUSH) 0.9 %
5-40 SYRINGE (ML) INJECTION PRN
Status: DISCONTINUED | OUTPATIENT
Start: 2023-04-12 | End: 2023-04-13 | Stop reason: HOSPADM

## 2023-04-12 RX ORDER — IBUPROFEN 800 MG/1
800 TABLET ORAL EVERY 6 HOURS
Status: DISCONTINUED | OUTPATIENT
Start: 2023-04-12 | End: 2023-04-13 | Stop reason: HOSPADM

## 2023-04-12 RX ORDER — HYDROCORTISONE 25 MG/G
CREAM TOPICAL
Status: DISCONTINUED | OUTPATIENT
Start: 2023-04-12 | End: 2023-04-13 | Stop reason: HOSPADM

## 2023-04-12 RX ORDER — SIMETHICONE 80 MG
80 TABLET,CHEWABLE ORAL EVERY 6 HOURS PRN
Status: DISCONTINUED | OUTPATIENT
Start: 2023-04-12 | End: 2023-04-13 | Stop reason: HOSPADM

## 2023-04-12 RX ORDER — ONDANSETRON 2 MG/ML
4 INJECTION INTRAMUSCULAR; INTRAVENOUS EVERY 6 HOURS PRN
Status: DISCONTINUED | OUTPATIENT
Start: 2023-04-12 | End: 2023-04-13 | Stop reason: HOSPADM

## 2023-04-12 RX ORDER — TERBUTALINE SULFATE 1 MG/ML
0.25 INJECTION, SOLUTION SUBCUTANEOUS ONCE
Status: DISCONTINUED | OUTPATIENT
Start: 2023-04-12 | End: 2023-04-12

## 2023-04-12 RX ORDER — SODIUM CHLORIDE, SODIUM LACTATE, POTASSIUM CHLORIDE, CALCIUM CHLORIDE 600; 310; 30; 20 MG/100ML; MG/100ML; MG/100ML; MG/100ML
INJECTION, SOLUTION INTRAVENOUS CONTINUOUS
Status: DISCONTINUED | OUTPATIENT
Start: 2023-04-12 | End: 2023-04-13 | Stop reason: HOSPADM

## 2023-04-12 RX ORDER — MISOPROSTOL 200 UG/1
800 TABLET ORAL PRN
Status: DISCONTINUED | OUTPATIENT
Start: 2023-04-12 | End: 2023-04-12

## 2023-04-12 RX ADMIN — Medication: at 22:31

## 2023-04-12 RX ADMIN — SODIUM CHLORIDE, SODIUM LACTATE, POTASSIUM CHLORIDE, CALCIUM CHLORIDE AND DEXTROSE MONOHYDRATE: 5; 600; 310; 30; 20 INJECTION, SOLUTION INTRAVENOUS at 01:35

## 2023-04-12 RX ADMIN — WITCH HAZEL: 500 SOLUTION RECTAL; TOPICAL at 22:32

## 2023-04-12 RX ADMIN — TRANEXAMIC ACID 1000 MG: 10 INJECTION, SOLUTION INTRAVENOUS at 06:08

## 2023-04-12 RX ADMIN — DOCUSATE SODIUM 100 MG: 100 CAPSULE ORAL at 12:01

## 2023-04-12 RX ADMIN — IBUPROFEN 800 MG: 800 TABLET, FILM COATED ORAL at 14:06

## 2023-04-12 RX ADMIN — DOCUSATE SODIUM 100 MG: 100 CAPSULE ORAL at 20:14

## 2023-04-12 RX ADMIN — ACETAMINOPHEN 1000 MG: 500 TABLET, FILM COATED ORAL at 22:38

## 2023-04-12 RX ADMIN — Medication 166.7 ML: at 04:37

## 2023-04-12 RX ADMIN — POLYETHYLENE GLYCOL 3350 17 G: 17 POWDER, FOR SOLUTION ORAL at 12:01

## 2023-04-12 RX ADMIN — Medication 87.3 MILLI-UNITS/MIN: at 06:15

## 2023-04-12 RX ADMIN — IBUPROFEN 800 MG: 800 TABLET, FILM COATED ORAL at 20:14

## 2023-04-12 ASSESSMENT — PAIN DESCRIPTION - ORIENTATION: ORIENTATION: ANTERIOR;LOWER

## 2023-04-12 ASSESSMENT — PAIN SCALES - GENERAL
PAINLEVEL_OUTOF10: 2
PAINLEVEL_OUTOF10: 2

## 2023-04-12 ASSESSMENT — PAIN DESCRIPTION - DESCRIPTORS
DESCRIPTORS: CRAMPING
DESCRIPTORS: CRAMPING

## 2023-04-12 ASSESSMENT — PAIN DESCRIPTION - LOCATION
LOCATION: ABDOMEN
LOCATION: ABDOMEN

## 2023-04-12 ASSESSMENT — PAIN - FUNCTIONAL ASSESSMENT: PAIN_FUNCTIONAL_ASSESSMENT: ACTIVITIES ARE NOT PREVENTED

## 2023-04-12 NOTE — LACTATION NOTE
This note was copied from a baby's chart. In to see mom and infant for first time. 2nd baby. Mom struggled to get first baby to ever breast feed well as was in quarantine w/ covid after birth of first baby at [facility] and states did not have good lactation support until 2 weeks after. This baby so far has had some attempts and one short feed after birth. Born this am. Mom trying to feed at this time. Mom holding baby to breast clothed in cradle position, baby feeding some but then got sleepy and came off. Got baby skin to skin w/ mom and then showed her football hold. Baby got on well and fed for 15 minutes. No c/o pain. Nipple round on release. Reviewed signs of good latch and alignment. Burped infant and baby fed another 5 minutes on other breast. Then came off satisfied and content. Showed dad how to swaddle baby. Mom came in plan w/ breast and formula feeding, as mom was not sure what to anticipate and how well baby would feed. Reviewed 1st and 2nd 24 hr feeding/output expectations. Encouraged her to keep offering both breasts first and then if still hungry could put baby back on breast, offer formula, or pump. Encouraged mom to ask for continued lactation assistance as needed.  Lactation to follow up in am.

## 2023-04-12 NOTE — PROGRESS NOTES
Melita Fried at bedside at [address] [facility]. MIKAEL Alegre RN at bedside at [address] [facility] pt to sitting up on bedside at 0048. Timeout completed at [phone] with MD, MIKAEL and myself at bedside. Test dose given at 0100. Negative reaction. Dose given at [address] [facility]. Pt assisted to lying back in right tilt position. See anesthesia record for details. See vital sign flow sheet for BP. Tolerated procedure well.

## 2023-04-12 NOTE — L&D DELIVERY NOTE
Episiotomy: None  Perineal Lacerations: 1st  Other Lacerations: no non-perineal laceration  Number of Repair Packets: 1       Vaginal Counts    Initial Count Personnel: Yu Hira  Initial Count Verified By: Jolie Antunez    Sponges Needles Instruments   Initial Counts Correct Correct Correct   Final Counts Correct Correct Correct   Final Count Personnel: Yu Hira  Final Count Verified By: Jolie Antunez  Accurate Final Count?: Yes  If the count is incorrect due to Intentionally Retained Foreign Object (IRFO) add the IRFO LDA in Lines/Drains. Add LDA: Link to [facility]       Blood Loss  Mother: Maco Crisostomo #[id number]     Start of Mother's Information      Delivery Blood Loss  23 2100 - 23 0504      None                 End of Mother's Information  Mother: Maco Crisostomo #[id number]                Delivery Providers    Delivering clinician: Crystal Nagel MD     Provider Role    Crystal Nagel MD Obstetrician    Nel Mendes, RN Primary Nurse    Miguel Miranda, RN Primary [place] Nurse    Valarie Blas [facility]              [place] Assessment    Living Status: Living  Delivery Location Comment: [id number]     Apgar Scoring Key:    0 1 2    Skin Color: Blue or pale Acrocyanotic Completely pink    Heart Rate: Absent <100 bpm >100 bpm    Reflex Irritability: No response Grimace Cry or active withdrawal    Muscle Tone: Limp Some flexion Active motion    Respiratory Effort: Absent Weak cry; hypoventilation Good, crying                      Skin Color:   Heart Rate:   Reflex Irritability:   Muscle Tone:   Respiratory Effort:    Total:            1 Minute:    0    2    2    2    2    8        Apgar 1 total from OB History    5 Minute:    1    2    2    2    2    9        Apgar 5 total from OB History    10 Minute:              15 Minute:              20 Minute:                        Apgars Assigned By: Rick Fitzgerald              Resuscitation    Method: Bulb Suction

## 2023-04-12 NOTE — LACTATION NOTE

## 2023-04-12 NOTE — PROGRESS NOTES
Pt assisted to restroom; voided small amount of   blood tinged urine. Rebeca care completed. New pad and panties and clean gown on. Pt back to bed; ambulated without difficulty.

## 2023-04-12 NOTE — H&P
Take by mouth 2 times daily    Historical Provider, MD   Prenatal Vit-Fe Fumarate-FA (PRENATAL VITAMIN PO) Take by mouth    Historical Provider, MD     Allergies   Allergen Reactions    Penicillins Rash       Physical Exam:  VITALS:  /74   Pulse (!) 127   Temp 98 °F (36.7 °C) (Oral)   Resp 18   Ht 5' 4\" (1.626 m)   Wt 164 lb (74.4 kg)   LMP 07/05/2022   SpO2 99%   BMI 28.15 kg/m²     CONSTITUTIONAL:  awake, alert, cooperative, no apparent distress at the moment (between contractions), and appears stated age  ABDOMEN:  non-tender  FHTs: Reactive and reassuring and Category I strip  Membranes: intact  Cervix: 4 cm dilated, 90% effaced, -2 station; was 2/60/-3 at last office check  Uterine activity/Contractions on monitor: Regular, every 5-6 minutes  Presentation: cephalic    Labs:   GBS negative      Assessment:  40w0d gestation  Labor progressing;   Reassuring fetal status    Plan: admit for labor    Discussed with: Dr. Serrato [place]

## 2023-04-13 VITALS
HEIGHT: 64 IN | DIASTOLIC BLOOD PRESSURE: 71 MMHG | TEMPERATURE: 97.5 F | WEIGHT: 164 LBS | HEART RATE: 88 BPM | BODY MASS INDEX: 28 KG/M2 | OXYGEN SATURATION: 98 % | SYSTOLIC BLOOD PRESSURE: 109 MMHG | RESPIRATION RATE: 17 BRPM

## 2023-04-13 PROCEDURE — 6370000000 HC RX 637 (ALT 250 FOR IP): Performed by: OBSTETRICS & GYNECOLOGY

## 2023-04-13 RX ORDER — IBUPROFEN 800 MG/1
800 TABLET ORAL EVERY 8 HOURS PRN
Qty: 60 TABLET | Refills: 0 | Status: SHIPPED | OUTPATIENT
Start: 2023-04-13

## 2023-04-13 RX ADMIN — ACETAMINOPHEN 1000 MG: 500 TABLET, FILM COATED ORAL at 05:24

## 2023-04-13 RX ADMIN — DOCUSATE SODIUM 100 MG: 100 CAPSULE ORAL at 08:10

## 2023-04-13 RX ADMIN — IBUPROFEN 800 MG: 800 TABLET, FILM COATED ORAL at 02:21

## 2023-04-13 ASSESSMENT — PAIN SCALES - GENERAL: PAINLEVEL_OUTOF10: 2

## 2023-04-13 NOTE — DISCHARGE INSTRUCTIONS
at a time. Put a thin cloth between the ice and your skin. Also try sitting in a few inches of warm water (sitz bath) 3 times a day and after bowel movements. Take pain medicines exactly as directed. If the doctor gave you a prescription medicine for pain, take it as prescribed. If you are not taking a prescription pain medicine, ask your doctor if you can take an over-the-counter medicine. Eat more fiber to avoid constipation. Include foods such as whole-grain breads and cereals, raw vegetables, raw and dried fruits, and beans. Drink plenty of fluids. If you have kidney, heart, or liver disease and have to limit fluids, talk with your doctor before you increase the amount of fluids you drink. Do not rinse inside your vagina with fluids (douche). If you have stitches, keep the area clean by pouring or spraying warm water over the area outside your vagina and anus after you use the toilet. Keep a list of questions to ask your doctor or midwife. Your questions might be about:  Changes in your breasts, such as lumps or soreness. When to expect your menstrual period to start again. What form of birth control is best for you. Weight you have put on during the pregnancy. Exercise options. What foods and drinks are best for you, especially if you are breastfeeding. Problems you might be having with breastfeeding. When you can have sex. You may want to talk about lubricants for your vagina. Any feelings of sadness or restlessness that you are having. When should you call for help? Share this information with your partner, family, or a friend. They can help you watch for warning signs. Call 911  anytime you think you may need emergency care. For example, call if:    You have thoughts of harming yourself, your baby, or another person. You passed out (lost consciousness). You have chest pain, are short of breath, or cough up blood. You have a seizure.    Call your doctor now or seek immediate

## 2023-04-13 NOTE — LACTATION NOTE
This note was copied from a baby's chart. In to see mom and infant prior to discharge to home. Mom and dad stated that infant has continued to latch and nurse well and very happy with how well infant is latching and nursing. Reviewed discharge information and answered questions. Mom and infant are following up with [facility] and will see lactation consultant there.

## 2023-04-13 NOTE — CARE COORDINATION
Chart reviewed - no needs identified. SW met with patient to complete initial assessment. PCP is [facility]. Patient denies any history of postpartum depression/anxiety. Patient given informational packet on  mood & anxiety disorders (resources/education). Family denies any additional needs from  at this time. Family has 's contact information should any needs/questions arise.     DEMAR Huerta, [address] [facility]   [phone]

## 2023-04-13 NOTE — PROGRESS NOTES
Progress Note                               Patient: Fiona Chaney MRN: [id number]  SSN: [id number]    YOB: 1990  Age: 29 y.o. Sex: female      Postpartum Day Number 1    Subjective:     Patient doing well without significant complaints. Ambulating, voiding without difficulty and Patient desires early discharge today. Patient reports normal lochia. . Infant: Breastfeeding and/or pumping    Objective:     Patient Vitals for the past 18 hrs:   Temp Pulse Resp BP SpO2   23 0735 97.9 °F (36.6 °C) 86 16 101/60 98 %   23 0023 98 °F (36.7 °C) 82 14 93/62 96 %        Temp (24hrs), Av °F (36.7 °C), Min:97.9 °F (36.6 °C), Max:98 °F (36.7 °C)      Physical Exam:    Patient without distress. Neuro / Psych grossly WNL, A&O X 3    Lab/Data Review: All lab results for the last 24 hours reviewed. Assessment and Plan:     Patient appears to be having an uncomplicated postpartum course. Continue routine perineal care and maternal education. , Will discharge home today.  Instructions, precautions reviewed    Milana Tavares MD

## 2023-04-13 NOTE — LACTATION NOTE

## 2023-04-14 ENCOUNTER — CARE COORDINATION (OUTPATIENT)
Dept: OTHER | Facility: CLINIC | Age: 33
End: 2023-04-14

## 2023-04-21 ENCOUNTER — CARE COORDINATION (OUTPATIENT)
Dept: OTHER | Facility: CLINIC | Age: 33
End: 2023-04-21

## 2023-05-25 ENCOUNTER — POSTPARTUM VISIT (OUTPATIENT)
Dept: OBGYN CLINIC | Age: 33
End: 2023-05-25

## 2023-05-25 VITALS — DIASTOLIC BLOOD PRESSURE: 72 MMHG | BODY MASS INDEX: 26.09 KG/M2 | SYSTOLIC BLOOD PRESSURE: 106 MMHG | WEIGHT: 152 LBS

## 2023-05-25 PROBLEM — O36.63X0 EXCESSIVE FETAL GROWTH AFFECTING MANAGEMENT OF MOTHER IN THIRD TRIMESTER, ANTEPARTUM: Status: RESOLVED | Noted: 2023-01-17 | Resolved: 2023-05-25

## 2023-05-25 PROBLEM — O99.013 ANEMIA AFFECTING PREGNANCY IN THIRD TRIMESTER: Status: RESOLVED | Noted: 2023-01-18 | Resolved: 2023-05-25

## 2023-05-25 PROBLEM — Z34.83 MULTIGRAVIDA IN THIRD TRIMESTER: Status: RESOLVED | Noted: 2022-09-01 | Resolved: 2023-05-25

## 2023-05-25 PROBLEM — Z87.42 HISTORY OF ABNORMAL CERVICAL PAP SMEAR: Status: RESOLVED | Noted: 2022-09-13 | Resolved: 2023-05-25

## 2023-05-25 ASSESSMENT — PATIENT HEALTH QUESTIONNAIRE - PHQ9
SUM OF ALL RESPONSES TO PHQ QUESTIONS 1-9: 0
SUM OF ALL RESPONSES TO PHQ QUESTIONS 1-9: 0
1. LITTLE INTEREST OR PLEASURE IN DOING THINGS: 0
SUM OF ALL RESPONSES TO PHQ9 QUESTIONS 1 & 2: 0
SUM OF ALL RESPONSES TO PHQ QUESTIONS 1-9: 0
2. FEELING DOWN, DEPRESSED OR HOPELESS: 0
SUM OF ALL RESPONSES TO PHQ QUESTIONS 1-9: 0

## 2023-05-25 NOTE — PROGRESS NOTES
Patient comes in today for 6 week post partum visit.      Delivery Method: Vaginal     Delivery Date: 04/12/2023     Birth Control: none    Breast/Bottle: bottle     Bleeding: none     Baby: Doing well    Bowel/Bladder: No issues    Blues: None    Last pap smear:  09/01/2022 negative Negative     Vitals:    05/25/23 1046   BP: 106/72        Tomeka Crowley MA  10:50 AM  05/25/23
Difficulty of Paying Living Expenses: Not hard at all   Food Insecurity: No Food Insecurity    Worried About [facility] in the Last Year: Never true    Ran Out of Food in the Last Year: Never true   Transportation Needs: Unknown    Lack of Transportation (Medical): Not on file    Lack of Transportation (Non-Medical):  No   Physical Activity: Not on file   Stress: Not on file   Social Connections: Not on file   Intimate Partner Violence: Not on file   Housing Stability: Unknown    Unable to Pay for Housing in the Last Year: Not on file    Number of Places Lived in the Last Year: Not on file    Unstable Housing in the Last Year: No       Vitals:    05/25/23 1046   BP: 106/72   Site: Left Upper Arm   Position: Sitting   Weight: 152 lb (68.9 kg)        Review of Systems    Constitutional:  No fevers or chills    CV: No chest pain or palpitations    Resp: No SOB or cough    GI:  No nausea/vomiting/diarrhea/constipation    Neuro: No HA, no seizure like activity    Skin: No rashes or lesions    Breast: No breast pain, masses, bleeding    : No dysuria or hematuria    Gyn:  No menstrual, pelvic issues      Physical Exam    General:  well developed, well nourished, in no acute distress     Head:   normocephalic and atraumatic     Mouth:  no deformity or lesions with good dentition     Neck:  no masses, thyromegaly, or abnormal cervical nodes     Lungs:  clear bilaterally with normal respirations     Heart:   regular rate and rhythm, S1, S2 without murmurs     Abdomen:  bowel sounds positive; abdomen soft and non-tender without masses, organomegaly, or hernias noted     Pelvic Exam:       External: normal female genitalia without lesions or masses       Vagina: normal without lesions or masses       Cervix: normal without lesions or masses       Adnexa: normal bimanual exam without masses or fullness       Uterus: normal by palpation       Pap smear: NOT performed     Msk:   no deformity or scoliosis noted with

## 2023-05-31 ENCOUNTER — CARE COORDINATION (OUTPATIENT)
Dept: OTHER | Facility: CLINIC | Age: 33
End: 2023-05-31

## 2023-06-27 NOTE — PROGRESS NOTES
Conjunctivae and eyelids appear normal,  Sclerae : White without injection  Patient comes in today for routine prenatal visit. No complaints/concerns today.      Fetal Movement: Yes  Contractions: No  Vaginal Bleeding: No  Leaking Fluid: No  GI/: No    Vitals:    12/27/22 0925   BP: 110/60   Site: Left Upper Arm   Position: Sitting   Weight: 149 lb (67.6 kg)   Height: 5' 4\" (1.626 m)

## 2024-01-02 ENCOUNTER — NURSE ONLY (OUTPATIENT)
Dept: FAMILY MEDICINE CLINIC | Facility: CLINIC | Age: 34
End: 2024-01-02
Payer: COMMERCIAL

## 2024-01-02 VITALS
OXYGEN SATURATION: 99 % | HEART RATE: 140 BPM | TEMPERATURE: 99.3 F | DIASTOLIC BLOOD PRESSURE: 72 MMHG | SYSTOLIC BLOOD PRESSURE: 110 MMHG

## 2024-01-02 DIAGNOSIS — B34.9 VIRAL SYNDROME: Primary | ICD-10-CM

## 2024-01-02 DIAGNOSIS — J10.1 INFLUENZA B: ICD-10-CM

## 2024-01-02 LAB
EXP DATE SOLUTION: NORMAL
EXP DATE SWAB: NORMAL
EXPIRATION DATE: NORMAL
INFLUENZA A ANTIGEN, POC: NEGATIVE
INFLUENZA B ANTIGEN, POC: POSITIVE
LOT NUMBER POC: NORMAL
LOT NUMBER SOLUTION: NORMAL
LOT NUMBER SWAB: NORMAL
SARS-COV-2 RNA, POC: POSITIVE
VALID INTERNAL CONTROL, POC: YES

## 2024-01-02 PROCEDURE — 87804 INFLUENZA ASSAY W/OPTIC: CPT | Performed by: FAMILY MEDICINE

## 2024-01-02 PROCEDURE — 99213 OFFICE O/P EST LOW 20 MIN: CPT | Performed by: FAMILY MEDICINE

## 2024-01-02 PROCEDURE — 87635 SARS-COV-2 COVID-19 AMP PRB: CPT | Performed by: FAMILY MEDICINE

## 2024-01-02 RX ORDER — ONDANSETRON 4 MG/1
4 TABLET, ORALLY DISINTEGRATING ORAL 3 TIMES DAILY PRN
Qty: 10 TABLET | Refills: 0 | Status: SHIPPED | OUTPATIENT
Start: 2024-01-02

## 2024-01-02 RX ORDER — OSELTAMIVIR PHOSPHATE 75 MG/1
75 CAPSULE ORAL 2 TIMES DAILY
Qty: 10 CAPSULE | Refills: 0 | Status: SHIPPED | OUTPATIENT
Start: 2024-01-02 | End: 2024-01-07

## 2024-01-02 ASSESSMENT — PATIENT HEALTH QUESTIONNAIRE - PHQ9
SUM OF ALL RESPONSES TO PHQ QUESTIONS 1-9: 0
SUM OF ALL RESPONSES TO PHQ QUESTIONS 1-9: 0
SUM OF ALL RESPONSES TO PHQ9 QUESTIONS 1 & 2: 0
2. FEELING DOWN, DEPRESSED OR HOPELESS: 0
SUM OF ALL RESPONSES TO PHQ QUESTIONS 1-9: 0
SUM OF ALL RESPONSES TO PHQ QUESTIONS 1-9: 0
1. LITTLE INTEREST OR PLEASURE IN DOING THINGS: 0

## 2024-01-02 ASSESSMENT — ENCOUNTER SYMPTOMS
WHEEZING: 0
COUGH: 1
SHORTNESS OF BREATH: 0

## 2024-01-02 NOTE — PROGRESS NOTES
Joanne Hansen is a 33 y.o. female who presents today for the following:  Chief Complaint   Patient presents with    Fever     Pt presents for fever fatigue cough congestion headache x 3 days       Allergies   Allergen Reactions    Penicillins Rash       Current Outpatient Medications   Medication Sig Dispense Refill    ibuprofen (ADVIL;MOTRIN) 800 MG tablet Take 1 tablet by mouth every 8 hours as needed for Pain (Patient not taking: Reported on 5/25/2023) 60 tablet 0    Iron, Ferrous Sulfate, 325 (65 Fe) MG TABS Take by mouth 2 times daily (Patient not taking: Reported on 5/25/2023)      Prenatal Vit-Fe Fumarate-FA (PRENATAL VITAMIN PO) Take by mouth (Patient not taking: Reported on 5/25/2023)       No current facility-administered medications for this visit.       Past Medical History:   Diagnosis Date    Abnormal Pap smear of cervix 2014    Anemia 2009    Stress incontinence 2020    With sneezing       Past Surgical History:   Procedure Laterality Date    COLPOSCOPY  2014    Have had 3 or so since the first       Social History     Tobacco Use    Smoking status: Never    Smokeless tobacco: Never   Substance Use Topics    Alcohol use: Not Currently     Alcohol/week: 7.0 standard drinks of alcohol     Types: 2 Glasses of wine, 5 Cans of beer per week        Family History   Problem Relation Age of Onset    Heart Surgery Paternal Grandfather     Stroke Maternal Grandmother     Breast Cancer Neg Hx     Colon Cancer Neg Hx     Ovarian Cancer Neg Hx     Uterine Cancer Neg Hx        Patient is here today for acute visit for viral syndrome.  Felt weak on Saturday.  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  Last fever this morning.           Review of Systems   Constitutional:  Positive for chills, fatigue and fever.   HENT:  Positive for congestion and ear pain.    Respiratory:  Positive for cough. Negative for shortness of breath and wheezing.         /72   Pulse (!) 140   Temp 99.3 °F

## 2024-01-03 ENCOUNTER — TELEPHONE (OUTPATIENT)
Dept: FAMILY MEDICINE CLINIC | Facility: CLINIC | Age: 34
End: 2024-01-03

## 2024-01-03 NOTE — TELEPHONE ENCOUNTER
Letter completed per Dr. Sun.   Called patient to notify, no answer. LMOM notifying that she should be able to view and print it from [employer]. If unable, we can print letter & have her  at office. SPC number left on voicemail if she has any questions.

## 2024-01-03 NOTE — TELEPHONE ENCOUNTER
Employee called requesting an out of work letter for 1/4/24 Thursday. She is still not feeling well and was instructed to call back if needed. She hopes to be able to return to work on Friday 1/5/24. Ok to print letter for patient?

## 2024-05-29 ENCOUNTER — OFFICE VISIT (OUTPATIENT)
Dept: OBGYN CLINIC | Age: 34
End: 2024-05-29
Payer: COMMERCIAL

## 2024-05-29 ENCOUNTER — TELEPHONE (OUTPATIENT)
Dept: OBGYN CLINIC | Age: 34
End: 2024-05-29

## 2024-05-29 VITALS
BODY MASS INDEX: 25.68 KG/M2 | SYSTOLIC BLOOD PRESSURE: 106 MMHG | WEIGHT: 150.4 LBS | HEIGHT: 64 IN | DIASTOLIC BLOOD PRESSURE: 62 MMHG

## 2024-05-29 DIAGNOSIS — Z01.419 WOMEN'S ANNUAL ROUTINE GYNECOLOGICAL EXAMINATION: Primary | ICD-10-CM

## 2024-05-29 DIAGNOSIS — N60.02 CYST OF LEFT BREAST: Primary | ICD-10-CM

## 2024-05-29 PROCEDURE — 99459 PELVIC EXAMINATION: CPT | Performed by: OBSTETRICS & GYNECOLOGY

## 2024-05-29 PROCEDURE — 99395 PREV VISIT EST AGE 18-39: CPT | Performed by: OBSTETRICS & GYNECOLOGY

## 2024-05-29 NOTE — PROGRESS NOTES
[facility]  [address], [place] [address]  [phone]    Joaquim Santos MD, FACOG  Jyoti Otoole, [facility]  Neena Grande MD, FACOG      Assessment/Plan     Patient Active Problem List    Diagnosis Date Noted    Women's annual routine gynecological examination 2024     Overview Note:     Last pap 2022 - neg, neg HPV       Assessment & Plan Note:     Exam as below  Encouraged annual exams with paps as indicated  Pt to F/U with PCP for all non-gyn health related issues         Problem List Items Addressed This Visit       Women's annual routine gynecological examination - Primary     Exam as below  Encouraged annual exams with paps as indicated  Pt to F/U with PCP for all non-gyn health related issues              Subjective     LAST PAP:  2022, neg., HPV neg.      LAST MAMMO:  never      LMP:  Patient's last menstrual period was 2024 (exact date).     BIRTH CONTROL:  none     TOBACCO USE:  No    Joanne Hansen 34 y.o.  presents today for annual Gyn exam. No issues or complaints today. Denies any vaginal D/C, pelvic pain, non-menstrual pelvic pain, F/C, assoc GI/ issues. No breast issues. Denies any neuro changes, visual changes, H/A, CP, SOB.         OB History    Para Term  AB Living   3 2 2   1 2   SAB IAB Ectopic Molar Multiple Live Births   1       0 2      # Outcome Date GA Lbr Heraclio/2nd Weight Sex Delivery Anes PTL Lv   3 Term 23 40w1d 05:58 / 01:36 3.1 kg (6 lb 13.4 oz) M Vag-Spont EPI N BRIT   2 Term 01/10/21 38w5d  3.313 kg (7 lb 4.9 oz) M Vag-Spont EPI  BRIT   1 SAB                    Past Medical History:   Diagnosis Date    Abnormal Pap smear of cervix 2014    Anemia 2009    Stress incontinence 2020    With sneezing    Women's annual routine gynecological examination 2024            Past Surgical History:   Procedure Laterality Date    COLPOSCOPY  2014    Have had 3 or so since the first           Family History

## 2024-05-29 NOTE — PROGRESS NOTES
Chaperone for Intimate Exam     Chaperone was offer accepted as part of the rooming process    Chaperone: Jacqui Villeda

## 2024-05-29 NOTE — PROGRESS NOTES
Patient here for AE. Denies issues/concerns today. Declines BC today.     LAST PAP:  9/1/2022, neg., HPV neg.     LAST MAMMO:  never     LMP:  Patient's last menstrual period was 05/26/2024 (exact date).    BIRTH CONTROL:  none    TOBACCO USE:  No    FAMILY HISTORY OF:   Breast Cancer:  No   Ovarian Cancer:  No   Uterine Cancer:  No   Colon Cancer:  No    Vitals:    05/29/24 0821   BP: 106/62   Site: Right Upper Arm   Position: Sitting   Weight: 68.2 kg (150 lb 6.4 oz)   Height: 1.626 m (5' 4\")        PHANI AGUAYO RN  05/29/24  8:26 AM

## 2024-05-29 NOTE — TELEPHONE ENCOUNTER
Sent [employer] message to update patient that her voicemail was received and that I sent her request to Dr. TUCKER.     Patient has not read [employer] message. Called patient, no answer, LVM with no details given.

## 2024-05-29 NOTE — TELEPHONE ENCOUNTER
Dr. Santos,     Patient called backed requesting US for breast that you had a found a cyst on during her AE today.  Left breast per your note.

## 2024-05-30 PROBLEM — N60.02 CYST OF LEFT BREAST: Status: ACTIVE | Noted: 2024-05-30

## 2024-05-30 NOTE — TELEPHONE ENCOUNTER
Called patient, updated her that breast ultrasound was ordered and that the breast center should call her, if she does not receive a call from them, that a [employer] message sent from me includes their phone number to call.     Patient verbalized understanding.

## 2024-06-28 PROBLEM — Z01.419 WOMEN'S ANNUAL ROUTINE GYNECOLOGICAL EXAMINATION: Status: RESOLVED | Noted: 2024-05-29 | Resolved: 2024-06-28

## 2024-11-03 SDOH — ECONOMIC STABILITY: FOOD INSECURITY: WITHIN THE PAST 12 MONTHS, YOU WORRIED THAT YOUR FOOD WOULD RUN OUT BEFORE YOU GOT MONEY TO BUY MORE.: NEVER TRUE

## 2024-11-03 SDOH — ECONOMIC STABILITY: TRANSPORTATION INSECURITY
IN THE PAST 12 MONTHS, HAS LACK OF TRANSPORTATION KEPT YOU FROM MEETINGS, WORK, OR FROM GETTING THINGS NEEDED FOR DAILY LIVING?: NO

## 2024-11-03 SDOH — ECONOMIC STABILITY: FOOD INSECURITY: WITHIN THE PAST 12 MONTHS, THE FOOD YOU BOUGHT JUST DIDN'T LAST AND YOU DIDN'T HAVE MONEY TO GET MORE.: NEVER TRUE

## 2024-11-03 SDOH — ECONOMIC STABILITY: INCOME INSECURITY: HOW HARD IS IT FOR YOU TO PAY FOR THE VERY BASICS LIKE FOOD, HOUSING, MEDICAL CARE, AND HEATING?: NOT VERY HARD

## 2024-11-04 ENCOUNTER — OFFICE VISIT (OUTPATIENT)
Dept: PRIMARY CARE CLINIC | Facility: CLINIC | Age: 34
End: 2024-11-04

## 2024-11-04 VITALS
HEART RATE: 83 BPM | TEMPERATURE: 98 F | HEIGHT: 64 IN | SYSTOLIC BLOOD PRESSURE: 110 MMHG | BODY MASS INDEX: 25.27 KG/M2 | OXYGEN SATURATION: 99 % | WEIGHT: 148 LBS | DIASTOLIC BLOOD PRESSURE: 62 MMHG

## 2024-11-04 DIAGNOSIS — Z76.89 ENCOUNTER TO ESTABLISH CARE WITH NEW DOCTOR: Primary | ICD-10-CM

## 2024-11-04 DIAGNOSIS — D22.9 ENLARGED SKIN MOLE: ICD-10-CM

## 2024-11-04 DIAGNOSIS — R00.2 PALPITATIONS: ICD-10-CM

## 2024-11-04 DIAGNOSIS — H91.91 HEARING LOSS OF RIGHT EAR, UNSPECIFIED HEARING LOSS TYPE: ICD-10-CM

## 2024-11-04 ASSESSMENT — ENCOUNTER SYMPTOMS
COUGH: 0
DIARRHEA: 0
SHORTNESS OF BREATH: 0
VOMITING: 0
NAUSEA: 0
ABDOMINAL PAIN: 0

## 2024-11-04 ASSESSMENT — PATIENT HEALTH QUESTIONNAIRE - PHQ9
SUM OF ALL RESPONSES TO PHQ QUESTIONS 1-9: 0
SUM OF ALL RESPONSES TO PHQ QUESTIONS 1-9: 0
1. LITTLE INTEREST OR PLEASURE IN DOING THINGS: NOT AT ALL
SUM OF ALL RESPONSES TO PHQ QUESTIONS 1-9: 0
2. FEELING DOWN, DEPRESSED OR HOPELESS: NOT AT ALL
SUM OF ALL RESPONSES TO PHQ QUESTIONS 1-9: 0
SUM OF ALL RESPONSES TO PHQ9 QUESTIONS 1 & 2: 0

## 2024-11-04 NOTE — ASSESSMENT & PLAN NOTE
New issue, has been going on intermittently, notes tachycardia after running.  EKG in the office shows.  Plan to check labs.  Discussed cutting back on caffeine and if this does not improve will refer to Cardiology.

## 2024-11-04 NOTE — ASSESSMENT & PLAN NOTE
Chronic for months, scant effusion behind L ear but R ear WNL.  Will try a trial of an antihistamine with a decongestant to see if this helps.  If not will refer to ENT.

## 2024-11-04 NOTE — PROGRESS NOTES
[facility]  Rocio Santos, DO  [address], [place] [address] [phone]          ASSESSMENT AND PLAN    Problem List Items Addressed This Visit          Circulatory    Palpitations     New issue, has been going on intermittently, notes tachycardia after running.  EKG in the office shows.  Plan to check labs.  Discussed cutting back on caffeine and if this does not improve will refer to Cardiology.         Relevant Orders    Lipid Panel    TSH    CBC with Auto Differential    Comprehensive Metabolic Panel    EKG 12 Lead (Completed)       Nervous and Auditory    Hearing loss of right ear      Chronic for months, scant effusion behind L ear but R ear WNL.  Will try a trial of an antihistamine with a decongestant to see if this helps.  If not will refer to ENT.         Relevant Orders    Lipid Panel    TSH    CBC with Auto Differential    Comprehensive Metabolic Panel    EKG 12 Lead (Completed)       Musculoskeletal and Integument    Enlarged skin mole      Large changing mole on left thigh, will refer to Dermatology.         Relevant Orders    [facility]       Other    Encounter to establish care with new doctor - Primary    Relevant Orders    [facility]        The diagnoses and plan were discussed with the patient, who verbalizes understanding and agrees with plan.  All questions answered.    Chief Complaint    Chief Complaint   Patient presents with    Hearing Problem     Right ear     Anxiety     Heart flutters       HISTORY OF PRESENT ILLNESS    34 y.o. female presents today to establish care with a new primary care provider. Has not had a PCP in a long time.  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  States that she had a baby last year with [employer] [facility].  States that she sometimes feels heart flutters, sometimes with positional changes at work.  Denies chest pain.  Lasts only for a few seconds.  Has not

## 2024-11-04 NOTE — PATIENT INSTRUCTIONS
IT WAS GREAT TO SEE YOU TODAY!    I WILL CONTACT YOU WITH THE RESULTS OF YOUR LABS.    PLEASE TAKE ALL MEDICATION AS DISCUSSED.    ~TAKE A CLARITIN OR ZYRTEC WITH SUDAFED TO SEE IF THIS HELPS REMOVE FLUID FROM BEHIND YOUR EAR DRUMS.  IF NOT AND THIS DOES NOT IMPROVE LET ME KNOW AND I WILL REFER YOU TO ENT.    I HAVE REFERRED YOU TO DERMATOLOGY, THEY WILL CALL YOU TO SCHEDULE THIS.    CUT BACK ON YOUR CAFFEINE.  IF YOUR PALPITATIONS GET WORSE LET ME KNOW AND I WILL REFER YOU TO CARDIOLOGY.    I WILL SEE YOU AGAIN IN 6 MONTHS BUT PLEASE CALL WITH CONCERNS [phone]

## 2025-01-17 ENCOUNTER — LAB (OUTPATIENT)
Dept: OBGYN CLINIC | Age: 35
End: 2025-01-17

## 2025-01-17 DIAGNOSIS — H91.91 HEARING LOSS OF RIGHT EAR, UNSPECIFIED HEARING LOSS TYPE: ICD-10-CM

## 2025-01-17 DIAGNOSIS — R00.2 PALPITATIONS: ICD-10-CM

## 2025-01-17 LAB
ALBUMIN SERPL-MCNC: 4.2 G/DL (ref 3.5–5)
ALBUMIN/GLOB SERPL: 1.1 (ref 1–1.9)
ALP SERPL-CCNC: 98 U/L (ref 35–104)
ALT SERPL-CCNC: 36 U/L (ref 8–45)
ANION GAP SERPL CALC-SCNC: 13 MMOL/L (ref 7–16)
AST SERPL-CCNC: 30 U/L (ref 15–37)
BASOPHILS # BLD: 0.04 K/UL (ref 0–0.2)
BASOPHILS NFR BLD: 0.5 % (ref 0–2)
BILIRUB SERPL-MCNC: 0.3 MG/DL (ref 0–1.2)
BUN SERPL-MCNC: 22 MG/DL (ref 6–23)
CALCIUM SERPL-MCNC: 9.8 MG/DL (ref 8.8–10.2)
CHLORIDE SERPL-SCNC: 102 MMOL/L (ref 98–107)
CHOLEST SERPL-MCNC: 154 MG/DL (ref 0–200)
CO2 SERPL-SCNC: 23 MMOL/L (ref 20–29)
CREAT SERPL-MCNC: 0.75 MG/DL (ref 0.6–1.1)
DIFFERENTIAL METHOD BLD: NORMAL
EOSINOPHIL # BLD: 0.16 K/UL (ref 0–0.8)
EOSINOPHIL NFR BLD: 2.2 % (ref 0.5–7.8)
ERYTHROCYTE [DISTWIDTH] IN BLOOD BY AUTOMATED COUNT: 12.3 % (ref 11.9–14.6)
GLOBULIN SER CALC-MCNC: 3.7 G/DL (ref 2.3–3.5)
GLUCOSE SERPL-MCNC: 96 MG/DL (ref 70–99)
HCT VFR BLD AUTO: 42.1 % (ref 35.8–46.3)
HDLC SERPL-MCNC: 48 MG/DL (ref 40–60)
HDLC SERPL: 3.2 (ref 0–5)
HGB BLD-MCNC: 13.5 G/DL (ref 11.7–15.4)
IMM GRANULOCYTES # BLD AUTO: 0.02 K/UL (ref 0–0.5)
IMM GRANULOCYTES NFR BLD AUTO: 0.3 % (ref 0–5)
LDLC SERPL CALC-MCNC: 90 MG/DL (ref 0–100)
LYMPHOCYTES # BLD: 2.12 K/UL (ref 0.5–4.6)
LYMPHOCYTES NFR BLD: 28.9 % (ref 13–44)
MCH RBC QN AUTO: 28.7 PG (ref 26.1–32.9)
MCHC RBC AUTO-ENTMCNC: 32.1 G/DL (ref 31.4–35)
MCV RBC AUTO: 89.6 FL (ref 82–102)
MONOCYTES # BLD: 0.42 K/UL (ref 0.1–1.3)
MONOCYTES NFR BLD: 5.7 % (ref 4–12)
NEUTS SEG # BLD: 4.57 K/UL (ref 1.7–8.2)
NEUTS SEG NFR BLD: 62.4 % (ref 43–78)
NRBC # BLD: 0 K/UL (ref 0–0.2)
PLATELET # BLD AUTO: 260 K/UL (ref 150–450)
PMV BLD AUTO: 12.3 FL (ref 9.4–12.3)
POTASSIUM SERPL-SCNC: 4.6 MMOL/L (ref 3.5–5.1)
PROT SERPL-MCNC: 7.9 G/DL (ref 6.3–8.2)
RBC # BLD AUTO: 4.7 M/UL (ref 4.05–5.2)
SODIUM SERPL-SCNC: 138 MMOL/L (ref 136–145)
TRIGL SERPL-MCNC: 83 MG/DL (ref 0–150)
TSH, 3RD GENERATION: 2.16 UIU/ML (ref 0.27–4.2)
VLDLC SERPL CALC-MCNC: 17 MG/DL (ref 6–23)
WBC # BLD AUTO: 7.3 K/UL (ref 4.3–11.1)

## 2025-04-14 ENCOUNTER — TELEPHONE (OUTPATIENT)
Dept: OBGYN CLINIC | Age: 35
End: 2025-04-14

## 2025-04-14 NOTE — TELEPHONE ENCOUNTER
Pt sent [employer] message below. Pt also has AE scheduled for 05/30/2025.     \"Hi Dr. Mariscal     Several months back at my last visit , I had a cyst on my left breast and at the time you said I could have it checked out further if I wanted, but pretty sure it was just a cyst. I elected not to have it checked out. Now, I have developed some point tenderness in the breast. Do I need to make an appt with your office again to have it checked out, or can a referral be made for further testing? Thank you!\"

## 2025-04-16 ENCOUNTER — TELEPHONE (OUTPATIENT)
Dept: OBGYN CLINIC | Age: 35
End: 2025-04-16

## 2025-04-16 DIAGNOSIS — N60.02 CYST OF LEFT BREAST: Primary | ICD-10-CM

## 2025-04-16 NOTE — TELEPHONE ENCOUNTER
Pt sent [employer] message stating she tried to schedule the breast US but the [facility] informed her since she is over the age of 30, she also has to receive a diagnostic mammogram. Please advise.

## 2025-05-05 ENCOUNTER — HOSPITAL ENCOUNTER (OUTPATIENT)
Dept: MAMMOGRAPHY | Age: 35
Discharge: HOME | End: 2025-05-08
Attending: OBSTETRICS & GYNECOLOGY
Payer: COMMERCIAL

## 2025-05-05 VITALS — WEIGHT: 147 LBS | BODY MASS INDEX: 25.1 KG/M2 | HEIGHT: 64 IN

## 2025-05-05 DIAGNOSIS — N60.02 CYST OF LEFT BREAST: ICD-10-CM

## 2025-05-05 PROCEDURE — 76642 ULTRASOUND BREAST LIMITED: CPT

## 2025-05-05 PROCEDURE — G0279 TOMOSYNTHESIS, MAMMO: HCPCS

## 2025-05-06 ENCOUNTER — OFFICE VISIT (OUTPATIENT)
Dept: PRIMARY CARE CLINIC | Facility: CLINIC | Age: 35
End: 2025-05-06
Payer: COMMERCIAL

## 2025-05-06 VITALS
TEMPERATURE: 97.6 F | BODY MASS INDEX: 25.78 KG/M2 | HEIGHT: 64 IN | DIASTOLIC BLOOD PRESSURE: 68 MMHG | WEIGHT: 151 LBS | OXYGEN SATURATION: 99 % | HEART RATE: 67 BPM | SYSTOLIC BLOOD PRESSURE: 100 MMHG

## 2025-05-06 DIAGNOSIS — N63.23 BREAST LUMP ON LEFT SIDE AT 4 O'CLOCK POSITION: Primary | ICD-10-CM

## 2025-05-06 DIAGNOSIS — R00.2 PALPITATIONS: ICD-10-CM

## 2025-05-06 DIAGNOSIS — H91.91 HEARING LOSS OF RIGHT EAR, UNSPECIFIED HEARING LOSS TYPE: ICD-10-CM

## 2025-05-06 PROCEDURE — 99213 OFFICE O/P EST LOW 20 MIN: CPT | Performed by: FAMILY MEDICINE

## 2025-05-06 SDOH — ECONOMIC STABILITY: INCOME INSECURITY: IN THE LAST 12 MONTHS, WAS THERE A TIME WHEN YOU WERE NOT ABLE TO PAY THE MORTGAGE OR RENT ON TIME?: NO

## 2025-05-06 SDOH — ECONOMIC STABILITY: FOOD INSECURITY: WITHIN THE PAST 12 MONTHS, YOU WORRIED THAT YOUR FOOD WOULD RUN OUT BEFORE YOU GOT MONEY TO BUY MORE.: NEVER TRUE

## 2025-05-06 SDOH — ECONOMIC STABILITY: TRANSPORTATION INSECURITY
IN THE PAST 12 MONTHS, HAS THE LACK OF TRANSPORTATION KEPT YOU FROM MEDICAL APPOINTMENTS OR FROM GETTING MEDICATIONS?: NO

## 2025-05-06 SDOH — ECONOMIC STABILITY: FOOD INSECURITY: WITHIN THE PAST 12 MONTHS, THE FOOD YOU BOUGHT JUST DIDN'T LAST AND YOU DIDN'T HAVE MONEY TO GET MORE.: NEVER TRUE

## 2025-05-06 ASSESSMENT — PATIENT HEALTH QUESTIONNAIRE - PHQ9
2. FEELING DOWN, DEPRESSED OR HOPELESS: NOT AT ALL
1. LITTLE INTEREST OR PLEASURE IN DOING THINGS: NOT AT ALL
SUM OF ALL RESPONSES TO PHQ QUESTIONS 1-9: 0
2. FEELING DOWN, DEPRESSED OR HOPELESS: NOT AT ALL
SUM OF ALL RESPONSES TO PHQ QUESTIONS 1-9: 0
1. LITTLE INTEREST OR PLEASURE IN DOING THINGS: NOT AT ALL
SUM OF ALL RESPONSES TO PHQ QUESTIONS 1-9: 0
SUM OF ALL RESPONSES TO PHQ QUESTIONS 1-9: 0
SUM OF ALL RESPONSES TO PHQ9 QUESTIONS 1 & 2: 0

## 2025-05-06 ASSESSMENT — ENCOUNTER SYMPTOMS
NAUSEA: 0
DIARRHEA: 0
ABDOMINAL PAIN: 0
COUGH: 0
VOMITING: 0
SHORTNESS OF BREATH: 0

## 2025-05-06 NOTE — ASSESSMENT & PLAN NOTE
Soreness noted on left breast lump, ultrasound and mammogram yesterday showed 3 attached masses, patient is now scheduled for a biopsy on Monday.

## 2025-05-06 NOTE — PROGRESS NOTES
[facility]  Rocio Santos, DO  [address], [address]  [place], [place] [id number] [phone]         ASSESSMENT AND PLAN    Problem List Items Addressed This Visit          Circulatory    Palpitations     Resolved, patient was skipping lunch so started eating more during the day and cut back on caffeine.            Nervous and Auditory    Hearing loss of right ear    Persistent, not worsening but not resolving despite using antihistamine.  Will refer to Audiology.         Relevant Orders    [facility] (Audiology), [place]       Other    Breast lump on left side at 4 o'clock position - Primary    Soreness noted on left breast lump, ultrasound and mammogram yesterday showed 3 attached masses, patient is now scheduled for a biopsy on Monday.             The diagnoses and plan were discussed with the patient, who verbalizes understanding and agrees with plan.  All questions answered.    Chief Complaint    Chief Complaint   Patient presents with    6 Month Follow-Up    Other     Obgyn, noted a cyst in left became tender now having a biopsy 5-         HISTORY OF PRESENT ILLNESS    35 y.o. female presents today for follow up.  Last seen six months ago to establish care, had labs checked and an EKG was ordered for palpitations, which was normal.  Notes that she was found to have a lump on the outside of her left breast on her last Ob/Gyn appointment.  Notes that initially it wasn't tender but started becoming sore.  Went yesterday for an ultrasound and mammogram and was told by the radiologist that she will likely need a biopsy, which is now scheduled for next Monday.  Has three areas on the left breast, the largest of which they are biopsying.  Denies family history of breast cancer.  Notes that her palpitations are better.  States that she was skipping lunch at the time so has been eating during the day and cut out caffeine and it is much better.  Has been

## 2025-05-06 NOTE — PATIENT INSTRUCTIONS
IT WAS GREAT TO SEE YOU TODAY!    WE WILL SEE WHAT YOUR BIOPSY SHOWS.    I WILL SEE YOU AGAIN IN 6 MONTHS BUT PLEASE CALL WITH CONCERNS [phone]

## 2025-05-06 NOTE — ASSESSMENT & PLAN NOTE
Persistent, not worsening but not resolving despite using antihistamine.  Will refer to Audiology.   No

## 2025-05-06 NOTE — ASSESSMENT & PLAN NOTE
Resolved, patient was skipping lunch so started eating more during the day and cut back on caffeine.

## 2025-05-12 ENCOUNTER — HOSPITAL ENCOUNTER (OUTPATIENT)
Dept: MAMMOGRAPHY | Age: 35
Discharge: HOME | End: 2025-05-15
Payer: COMMERCIAL

## 2025-05-12 DIAGNOSIS — R93.89 ABNORMAL FINDING ON ULTRASOUND: ICD-10-CM

## 2025-05-12 PROCEDURE — 2709999900 US BREAST BIOPSY W LOC DEVICE 1ST LESION LEFT

## 2025-05-12 PROCEDURE — 88305 TISSUE EXAM BY PATHOLOGIST: CPT

## 2025-05-12 PROCEDURE — 77065 DX MAMMO INCL CAD UNI: CPT

## 2025-05-12 PROCEDURE — 6360000002 HC RX W HCPCS: Performed by: OBSTETRICS & GYNECOLOGY

## 2025-05-12 RX ORDER — LIDOCAINE HYDROCHLORIDE 10 MG/ML
5 INJECTION, SOLUTION INFILTRATION; PERINEURAL ONCE
Status: COMPLETED | OUTPATIENT
Start: 2025-05-12 | End: 2025-05-12

## 2025-05-12 RX ADMIN — LIDOCAINE HYDROCHLORIDE 5 ML: 10 INJECTION, SOLUTION INFILTRATION; PERINEURAL at 08:51

## 2025-05-15 ENCOUNTER — TELEPHONE (OUTPATIENT)
Dept: MAMMOGRAPHY | Age: 35
End: 2025-05-15

## 2025-05-15 NOTE — TELEPHONE ENCOUNTER
I spoke with the patient regarding the results of her recent breast biopsy. 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 She was a bit sore but otherwise had no post biopsy issues. Radiologist recommends a Rt breast u/s biopsy and surgical consult. The patient was going out of town and wanted to discuss having another biopsy with her . She will call back when she is back in town to discuss further, we will wait for surgeon appointment until we hear back from her.

## 2025-05-28 ENCOUNTER — TELEPHONE (OUTPATIENT)
Dept: OBGYN CLINIC | Age: 35
End: 2025-05-28

## 2025-05-28 NOTE — TELEPHONE ENCOUNTER
Jailyn Soriano LM stating patient needs an order for right breast ultrasound. She stated she sent an order to Dr. Santos for signature. Dr. Santos made aware. mc

## 2025-05-29 PROBLEM — Z01.419 WOMEN'S ANNUAL ROUTINE GYNECOLOGICAL EXAMINATION: Status: ACTIVE | Noted: 2025-05-29

## 2025-05-30 ENCOUNTER — OFFICE VISIT (OUTPATIENT)
Dept: OBGYN CLINIC | Age: 35
End: 2025-05-30
Payer: COMMERCIAL

## 2025-05-30 VITALS
BODY MASS INDEX: 25.61 KG/M2 | SYSTOLIC BLOOD PRESSURE: 104 MMHG | HEIGHT: 64 IN | DIASTOLIC BLOOD PRESSURE: 62 MMHG | WEIGHT: 150 LBS

## 2025-05-30 DIAGNOSIS — Z01.419 WOMEN'S ANNUAL ROUTINE GYNECOLOGICAL EXAMINATION: Primary | ICD-10-CM

## 2025-05-30 PROCEDURE — 99395 PREV VISIT EST AGE 18-39: CPT | Performed by: OBSTETRICS & GYNECOLOGY

## 2025-05-30 PROCEDURE — 99459 PELVIC EXAMINATION: CPT | Performed by: OBSTETRICS & GYNECOLOGY

## 2025-05-30 NOTE — ADDENDUM NOTE
Addended by: DEANGELO AMEZCUA on: 5/30/2025 08:56 AM     Modules accepted: Orders, Level of Service

## 2025-05-30 NOTE — PROGRESS NOTES
Chaperone for Intimate Exam     Chaperone was offer accepted as part of the rooming process    Chaperone: Jacqui Villeda     
Patient here for AE.   Right breast biopsy occurring on 6/9/2025 due findings during 5/12/2025 imaging.     LAST PAP:  9/1/2022, neg., HPV neg.     LAST MAMMO:  5/12/2025    LMP:  Patient's last menstrual period was 05/12/2025 (approximate).    BIRTH CONTROL:  none    TOBACCO USE:  No    FAMILY HISTORY OF:   Breast Cancer:  No   Ovarian Cancer:  No   Uterine Cancer:  No   Colon Cancer:  No    Vitals:    05/30/25 0838   BP: 104/62   BP Site: Right Upper Arm   Patient Position: Sitting   Weight: 68 kg (150 lb)   Height: 1.626 m (5' 4\")        PHANI AGUAYO RN  05/30/25  8:45 AM    
incontinence 2020    With sneezing            Past Surgical History:   Procedure Laterality Date    COLPOSCOPY  2014    Have had 3 or so since the first    US BREAST BIOPSY W LOC DEVICE 1ST LESION LEFT Left 5/12/2025    US BREAST BIOPSY W LOC DEVICE 1ST LESION LEFT 5/12/2025 Kelly Chan MD [facility]           Family History   Problem Relation Age of Onset    Heart Surgery Paternal Grandfather     Colon Cancer Paternal Grandfather     Stroke Maternal Grandmother     Colon Cancer Maternal Grandfather     Breast Cancer Neg Hx     Ovarian Cancer Neg Hx     Uterine Cancer Neg Hx            Social History     Socioeconomic History    Marital status:      Spouse name: Not on file    Number of children: Not on file    Years of education: Not on file    Highest education level: Not on file   Occupational History    Not on file   Tobacco Use    Smoking status: Never    Smokeless tobacco: Never   Substance and Sexual Activity    Alcohol use: Yes     Alcohol/week: 4.0 standard drinks of alcohol     Types: 2 Glasses of wine, 2 Cans of beer per week    Drug use: Never    Sexual activity: Yes     Partners: Male     Birth control/protection: None   Other Topics Concern    Not on file   Social History Narrative    Abuse: Feels safe at home, no history of physical abuse, no history of sexual abuse      Social Drivers of Health     Financial Resource Strain: Low Risk  (11/3/2024)    Overall Financial Resource Strain (CARDIA)     Difficulty of Paying Living Expenses: Not very hard   Food Insecurity: No Food Insecurity (5/6/2025)    Hunger Vital Sign     Worried About Running Out of Food in the Last Year: Never true     Ran Out of Food in the Last Year: Never true   Transportation Needs: No Transportation Needs (5/6/2025)    PRAPARE - Transportation     Lack of Transportation (Medical): No     Lack of Transportation (Non-Medical): No   Physical Activity: Not on file   Stress: Not on file   Social Connections:

## 2025-06-04 ENCOUNTER — RESULTS FOLLOW-UP (OUTPATIENT)
Dept: OBGYN CLINIC | Age: 35
End: 2025-06-04

## 2025-06-04 LAB
COLLECTION METHOD: NORMAL
CYTOLOGIST CVX/VAG CYTO: NORMAL
CYTOLOGY CVX/VAG DOC THIN PREP: NORMAL
DATE OF LMP: NORMAL
HPV APTIMA: NEGATIVE
HPV GENOTYPE REFLEX: NORMAL
Lab: NORMAL
PAP SOURCE: NORMAL
PATH REPORT.FINAL DX SPEC: NORMAL
STAT OF ADQ CVX/VAG CYTO-IMP: NORMAL

## 2025-06-09 ENCOUNTER — HOSPITAL ENCOUNTER (OUTPATIENT)
Dept: MAMMOGRAPHY | Age: 35
Discharge: HOME | End: 2025-06-12
Payer: COMMERCIAL

## 2025-06-09 DIAGNOSIS — R92.8 ABNORMAL ULTRASOUND OF BREAST: ICD-10-CM

## 2025-06-09 PROCEDURE — 19083 BX BREAST 1ST LESION US IMAG: CPT

## 2025-06-09 PROCEDURE — 77065 DX MAMMO INCL CAD UNI: CPT

## 2025-06-09 PROCEDURE — 6360000002 HC RX W HCPCS: Performed by: FAMILY MEDICINE

## 2025-06-09 PROCEDURE — 88305 TISSUE EXAM BY PATHOLOGIST: CPT

## 2025-06-09 RX ORDER — LIDOCAINE HYDROCHLORIDE 10 MG/ML
10 INJECTION, SOLUTION INFILTRATION; PERINEURAL ONCE
Status: COMPLETED | OUTPATIENT
Start: 2025-06-09 | End: 2025-06-09

## 2025-06-09 RX ADMIN — LIDOCAINE HYDROCHLORIDE 10 ML: 10 INJECTION, SOLUTION INFILTRATION; PERINEURAL at 09:43

## 2025-06-10 ENCOUNTER — TELEPHONE (OUTPATIENT)
Dept: MAMMOGRAPHY | Age: 35
End: 2025-06-10

## 2025-06-10 NOTE — TELEPHONE ENCOUNTER
I spoke with the patient regarding the results of her second breast biopsy. Rt u/s biopsy results are benign, fibroadenoma. She had no post biopsy issues or concerns. She will meet with Dr Jacques on 6-16 for surgical consult on left breast. Patient will return for a bilateral u/s in 6 months.

## 2025-06-11 ENCOUNTER — TELEPHONE (OUTPATIENT)
Dept: MAMMOGRAPHY | Age: 35
End: 2025-06-11

## 2025-06-11 NOTE — TELEPHONE ENCOUNTER
I spoke with the patient regarding the results of her recent breast biopsy. Pathology:benign;fibroadenoma. She had no post biopsy issues or concerns and will meet with Dr Jacques on 6-16 for surgical consult based on the results of her prior L breast biopsy on 5-12-25.    Radiologist recommendation is a bilateral u/s in 6 months. If patient has surgery on left breast then 6 month R breast u/s is recommended

## 2025-06-16 ENCOUNTER — OFFICE VISIT (OUTPATIENT)
Dept: SURGERY | Age: 35
End: 2025-06-16
Payer: COMMERCIAL

## 2025-06-16 VITALS
HEART RATE: 88 BPM | DIASTOLIC BLOOD PRESSURE: 74 MMHG | BODY MASS INDEX: 25.78 KG/M2 | WEIGHT: 151 LBS | HEIGHT: 64 IN | SYSTOLIC BLOOD PRESSURE: 122 MMHG

## 2025-06-16 DIAGNOSIS — D48.62: ICD-10-CM

## 2025-06-16 DIAGNOSIS — N64.4 MASTODYNIA: ICD-10-CM

## 2025-06-16 DIAGNOSIS — R92.30 DENSE BREAST: ICD-10-CM

## 2025-06-16 DIAGNOSIS — R92.8 ABNORMAL FINDING ON BREAST IMAGING: Primary | ICD-10-CM

## 2025-06-16 PROCEDURE — 99204 OFFICE O/P NEW MOD 45 MIN: CPT | Performed by: SURGERY

## 2025-06-16 NOTE — PROGRESS NOTES
History of Present Illness  The patient presents for evaluation of a left breast lump.    Approximately 7 to 8 months ago, during a routine gynecological exam, a new lump was detected on the left breast. Initially asymptomatic, she was informed it was likely a cyst and opted against immediate evaluation. Weeks later, the lump caused discomfort, prompting further investigation. Ultrasound and mammogram at [facility] revealed two masses on the left breast and one on the right. 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    No prior breast issues. No other medical problems. No regular medications. Past surgery: tonsillectomy. Two children aged 2 and 4, did not breastfeed due to insufficient milk production.    FAMILY HISTORY  - Negative for breast cancer       Results  Imaging   - Ultrasound of left breast: Two masses, largest 1.6 x 0.8 x 1 cm, lobulated angulated margins. Adjacent mass at 3:00, 1.8 x 0.8 x 1.7 cm.   - Ultrasound of right breast: One mass at 2:00, 1.7 x 1 x 1.6 cm.   - Mammogram of left breast: Heterogeneously dense tissue, benign calcifications, no suspicious mass, no architectural distortion, skin thickening, or nipple retraction.   - Mammogram of right breast: Heterogeneously dense tissue, benign calcifications, no suspicious mass, no architectural distortion, skin thickening, or nipple retraction.    Biopsy   - Left breast mass: Fibroepithelial lesion, differential includes fibroadenoma or benign phyllodes tumor.   - Right breast mass: Fibroadenoma.       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

## 2025-06-29 PROBLEM — Z01.419 WOMEN'S ANNUAL ROUTINE GYNECOLOGICAL EXAMINATION: Status: RESOLVED | Noted: 2025-05-29 | Resolved: 2025-06-29
